# Patient Record
Sex: FEMALE | Race: WHITE | NOT HISPANIC OR LATINO | Employment: OTHER | URBAN - METROPOLITAN AREA
[De-identification: names, ages, dates, MRNs, and addresses within clinical notes are randomized per-mention and may not be internally consistent; named-entity substitution may affect disease eponyms.]

---

## 2023-02-15 PROBLEM — L98.9 SKIN LESION: Status: RESOLVED | Noted: 2023-02-15 | Resolved: 2023-02-15

## 2023-02-15 PROBLEM — I73.00 RAYNAUD'S PHENOMENON (SECONDARY): Status: ACTIVE | Noted: 2023-02-15

## 2023-02-15 PROBLEM — F32.A DEPRESSION: Status: ACTIVE | Noted: 2023-02-15

## 2023-02-15 PROBLEM — R79.89 LOW TSH LEVEL: Status: ACTIVE | Noted: 2023-02-15

## 2023-02-15 PROBLEM — H53.2 DOUBLE VISION: Status: ACTIVE | Noted: 2023-02-15

## 2023-02-15 PROBLEM — R92.8 ABNORMAL MAMMOGRAM: Status: RESOLVED | Noted: 2023-02-15 | Resolved: 2023-02-15

## 2023-02-15 PROBLEM — M62.838 MUSCLE SPASMS OF NECK: Status: RESOLVED | Noted: 2023-02-15 | Resolved: 2023-02-15

## 2023-02-15 PROBLEM — M94.0 ACUTE COSTOCHONDRITIS: Status: RESOLVED | Noted: 2023-02-15 | Resolved: 2023-02-15

## 2023-02-15 PROBLEM — B02.9 SHINGLES: Status: RESOLVED | Noted: 2023-02-15 | Resolved: 2023-02-15

## 2023-02-15 PROBLEM — E03.9 ADULT HYPOTHYROIDISM: Status: ACTIVE | Noted: 2023-02-15

## 2023-02-15 PROBLEM — I49.3 PVC (PREMATURE VENTRICULAR CONTRACTION): Status: ACTIVE | Noted: 2023-02-15

## 2023-02-15 PROBLEM — B02.29 POST HERPETIC NEURALGIA: Status: RESOLVED | Noted: 2023-02-15 | Resolved: 2023-02-15

## 2023-02-15 PROBLEM — M25.519 SHOULDER PAIN: Status: RESOLVED | Noted: 2023-02-15 | Resolved: 2023-02-15

## 2023-02-15 PROBLEM — M25.569 KNEE PAIN: Status: RESOLVED | Noted: 2023-02-15 | Resolved: 2023-02-15

## 2023-02-15 PROBLEM — S72.009A CLOSED FRACTURE OF PART OF NECK OF FEMUR (MULTI): Status: ACTIVE | Noted: 2023-02-15

## 2023-02-15 PROBLEM — K59.09 CONSTIPATION, CHRONIC: Status: ACTIVE | Noted: 2023-02-15

## 2023-02-15 PROBLEM — S16.1XXD NECK STRAIN, SUBSEQUENT ENCOUNTER: Status: RESOLVED | Noted: 2023-02-15 | Resolved: 2023-02-15

## 2023-02-15 PROBLEM — S82.891A CLOSED FRACTURE OF RIGHT ANKLE: Status: ACTIVE | Noted: 2023-02-15

## 2023-02-15 PROBLEM — G47.00 INSOMNIA: Status: ACTIVE | Noted: 2023-02-15

## 2023-02-15 PROBLEM — M81.0 OSTEOPOROSIS: Status: ACTIVE | Noted: 2023-02-15

## 2023-02-15 PROBLEM — R00.1 BRADYCARDIA: Status: ACTIVE | Noted: 2023-02-15

## 2023-02-15 PROBLEM — K63.5 COLON POLYP: Status: ACTIVE | Noted: 2023-02-15

## 2023-02-15 PROBLEM — I10 HYPERTENSION: Status: ACTIVE | Noted: 2023-02-15

## 2023-02-15 PROBLEM — S49.90XA SHOULDER INJURY: Status: RESOLVED | Noted: 2023-02-15 | Resolved: 2023-02-15

## 2023-02-15 PROBLEM — F41.9 ANXIETY DISORDER: Status: ACTIVE | Noted: 2023-02-15

## 2023-02-15 PROBLEM — S82.873D CLOSED DISPLACED PILON FRACTURE OF TIBIA WITH ROUTINE HEALING: Status: ACTIVE | Noted: 2023-02-15

## 2023-02-15 RX ORDER — RALOXIFENE HYDROCHLORIDE 60 MG/1
1 TABLET, FILM COATED ORAL DAILY
COMMUNITY
Start: 2020-03-04 | End: 2023-10-30 | Stop reason: SDUPTHER

## 2023-02-15 RX ORDER — ZOLPIDEM TARTRATE 10 MG/1
1 TABLET ORAL DAILY
COMMUNITY
Start: 2022-08-04 | End: 2023-06-05 | Stop reason: SDUPTHER

## 2023-02-15 RX ORDER — QUETIAPINE FUMARATE 100 MG/1
1 TABLET, FILM COATED ORAL NIGHTLY
COMMUNITY
Start: 2022-05-03 | End: 2023-08-09 | Stop reason: ALTCHOICE

## 2023-02-15 RX ORDER — GABAPENTIN 300 MG/1
1 CAPSULE ORAL 4 TIMES DAILY
COMMUNITY
Start: 2020-08-04 | End: 2023-06-08 | Stop reason: SDUPTHER

## 2023-02-15 RX ORDER — METOPROLOL SUCCINATE 25 MG/1
1 TABLET, EXTENDED RELEASE ORAL DAILY
COMMUNITY
Start: 2019-12-12 | End: 2023-09-12 | Stop reason: SDUPTHER

## 2023-02-15 RX ORDER — LIOTHYRONINE SODIUM 5 UG/1
1 TABLET ORAL 3 TIMES DAILY
COMMUNITY
Start: 2019-04-16 | End: 2023-11-14 | Stop reason: SDUPTHER

## 2023-02-15 RX ORDER — CITALOPRAM 20 MG/1
1 TABLET, FILM COATED ORAL DAILY
COMMUNITY
Start: 2020-02-12 | End: 2023-05-09 | Stop reason: SDUPTHER

## 2023-02-15 RX ORDER — LEVOTHYROXINE SODIUM 50 UG/1
1 TABLET ORAL
COMMUNITY
Start: 2019-09-10 | End: 2023-05-09 | Stop reason: SDUPTHER

## 2023-03-17 ENCOUNTER — TELEPHONE (OUTPATIENT)
Dept: PRIMARY CARE | Facility: CLINIC | Age: 83
End: 2023-03-17
Payer: MEDICARE

## 2023-03-17 DIAGNOSIS — N76.0 VAGINITIS AND VULVOVAGINITIS: Primary | ICD-10-CM

## 2023-03-17 RX ORDER — MIRTAZAPINE 7.5 MG/1
7.5 TABLET, FILM COATED ORAL NIGHTLY
COMMUNITY
Start: 2022-04-29 | End: 2023-08-09 | Stop reason: ALTCHOICE

## 2023-03-17 RX ORDER — TRIAMCINOLONE ACETONIDE 1 MG/G
OINTMENT TOPICAL
COMMUNITY
Start: 2022-04-13

## 2023-03-17 RX ORDER — LEVALBUTEROL TARTRATE 45 UG/1
AEROSOL, METERED ORAL
COMMUNITY
Start: 2023-02-13 | End: 2023-08-09 | Stop reason: ALTCHOICE

## 2023-03-17 RX ORDER — NYSTATIN AND TRIAMCINOLONE ACETONIDE 100000; 1 [USP'U]/G; MG/G
CREAM TOPICAL 2 TIMES DAILY
Qty: 30 G | Refills: 1 | Status: SHIPPED | OUTPATIENT
Start: 2023-03-17 | End: 2023-08-09 | Stop reason: ALTCHOICE

## 2023-03-17 RX ORDER — MELOXICAM 15 MG/1
1 TABLET ORAL DAILY
COMMUNITY
Start: 2022-03-26 | End: 2023-08-09 | Stop reason: ALTCHOICE

## 2023-03-17 RX ORDER — VERAPAMIL HYDROCHLORIDE 120 MG/1
120 CAPSULE, EXTENDED RELEASE ORAL DAILY
COMMUNITY
Start: 2023-01-19 | End: 2023-04-06 | Stop reason: ALTCHOICE

## 2023-03-17 RX ORDER — LIDOCAINE 560 MG/1
PATCH PERCUTANEOUS; TOPICAL; TRANSDERMAL 3 TIMES DAILY PRN
COMMUNITY
Start: 2022-10-15 | End: 2023-08-09 | Stop reason: ALTCHOICE

## 2023-04-06 ENCOUNTER — OFFICE VISIT (OUTPATIENT)
Dept: PRIMARY CARE | Facility: CLINIC | Age: 83
End: 2023-04-06
Payer: MEDICARE

## 2023-04-06 VITALS
SYSTOLIC BLOOD PRESSURE: 140 MMHG | HEIGHT: 59 IN | DIASTOLIC BLOOD PRESSURE: 86 MMHG | OXYGEN SATURATION: 97 % | HEART RATE: 71 BPM | WEIGHT: 107 LBS | BODY MASS INDEX: 21.57 KG/M2 | TEMPERATURE: 97.1 F

## 2023-04-06 DIAGNOSIS — B02.22 POST-HERPETIC TRIGEMINAL NEURALGIA: ICD-10-CM

## 2023-04-06 DIAGNOSIS — I10 PRIMARY HYPERTENSION: Primary | ICD-10-CM

## 2023-04-06 PROCEDURE — 3077F SYST BP >= 140 MM HG: CPT | Performed by: FAMILY MEDICINE

## 2023-04-06 PROCEDURE — 64600 INJECTION TREATMENT OF NERVE: CPT | Performed by: FAMILY MEDICINE

## 2023-04-06 PROCEDURE — 3079F DIAST BP 80-89 MM HG: CPT | Performed by: FAMILY MEDICINE

## 2023-04-06 PROCEDURE — 99213 OFFICE O/P EST LOW 20 MIN: CPT | Performed by: FAMILY MEDICINE

## 2023-04-06 RX ORDER — VERAPAMIL HYDROCHLORIDE 180 MG/1
180 TABLET, FILM COATED, EXTENDED RELEASE ORAL NIGHTLY
Qty: 90 TABLET | Refills: 3 | Status: SHIPPED | OUTPATIENT
Start: 2023-04-06 | End: 2023-08-09 | Stop reason: ALTCHOICE

## 2023-04-06 ASSESSMENT — PAIN SCALES - GENERAL: PAINLEVEL: 4

## 2023-04-19 PROBLEM — B02.22 POST-HERPETIC TRIGEMINAL NEURALGIA: Status: ACTIVE | Noted: 2023-02-15

## 2023-04-19 ASSESSMENT — ENCOUNTER SYMPTOMS
COUGH: 0
HEMATURIA: 0
EYE PAIN: 0
FEVER: 0
FREQUENCY: 0
WEAKNESS: 0
UNEXPECTED WEIGHT CHANGE: 0
PALPITATIONS: 0
CONFUSION: 0
NUMBNESS: 0
VOMITING: 0
HALLUCINATIONS: 0
SORE THROAT: 0
NAUSEA: 0
DECREASED CONCENTRATION: 0
DIARRHEA: 0
FATIGUE: 0
DIZZINESS: 0
ABDOMINAL PAIN: 0
JOINT SWELLING: 0
SHORTNESS OF BREATH: 0
BLOOD IN STOOL: 0
DYSURIA: 0
HEADACHES: 0
TROUBLE SWALLOWING: 0

## 2023-04-19 NOTE — PATIENT INSTRUCTIONS
It was nice to see you today!  Discussed current concerns and addressed, injections done with relief of pain  Reviewed recent labs and diagnostics  Reviewed medications list, RF verapamil  Continue to eat a healthy diet, exercise at least 3 times a week or more  Plan and follow up discussed

## 2023-04-19 NOTE — PROGRESS NOTES
Subjective   Patient ID: Selam Sanabria is a 83 y.o. female.    Patient here to have supraorbital and supratrochlear nerve block. 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 Oral and topical meds little help. Ha shad before. BP good on meds. Needs RF, no CP, no generalized HA. Doing well. Active. Diet good.        Review of Systems   Constitutional:  Negative for fatigue, fever and unexpected weight change.   HENT:  Negative for congestion, ear pain, hearing loss, sore throat and trouble swallowing.         +R brow and forehead pain, burning.   Eyes:  Negative for pain and visual disturbance.   Respiratory:  Negative for cough and shortness of breath.    Cardiovascular:  Negative for chest pain, palpitations and leg swelling.   Gastrointestinal:  Negative for abdominal pain, blood in stool, diarrhea, nausea and vomiting.   Genitourinary:  Negative for dysuria, frequency, hematuria and urgency.   Musculoskeletal:  Negative for joint swelling.   Skin:  Negative for pallor and rash.   Neurological:  Negative for dizziness, syncope, weakness, numbness and headaches.   Psychiatric/Behavioral:  Negative for confusion, decreased concentration, hallucinations and suicidal ideas.      Vitals:    04/06/23 1416   BP: 140/86   Pulse: 71   Temp: 36.2 °C (97.1 °F)   SpO2: 97%      Objective   Physical Exam  Constitutional:       Appearance: Normal appearance.   HENT:      Head: Normocephalic.        Comments: Red area injected .3cc each of soln of kenalog .5cc (20mg) and .5cc lidocaine with epi.   Blue area is where pain is.  Cardiovascular:      Rate and Rhythm: Normal rate and regular rhythm.      Heart sounds: Normal heart sounds.   Pulmonary:      Effort: Pulmonary effort is normal.      Breath sounds: Normal breath sounds.   Musculoskeletal:      Cervical back: Neck supple.   Skin:     General: Skin is warm and dry.   Neurological:      General: No focal deficit present.      Mental Status: She is alert.   Psychiatric:          Mood and Affect: Mood normal.         Speech: Speech normal.         Behavior: Behavior normal.         Cognition and Memory: Cognition normal.         Assessment/Plan   Diagnoses and all orders for this visit:  Primary hypertension  -     verapamil SR (Calan-SR) 180 mg ER tablet; Take 1 tablet (180 mg) by mouth once daily at bedtime. Do not crush or chew.

## 2023-04-24 ENCOUNTER — OFFICE VISIT (OUTPATIENT)
Dept: PRIMARY CARE | Facility: CLINIC | Age: 83
End: 2023-04-24
Payer: MEDICARE

## 2023-04-24 VITALS
BODY MASS INDEX: 21.2 KG/M2 | OXYGEN SATURATION: 98 % | SYSTOLIC BLOOD PRESSURE: 100 MMHG | TEMPERATURE: 97.2 F | HEART RATE: 69 BPM | DIASTOLIC BLOOD PRESSURE: 60 MMHG | HEIGHT: 60 IN | WEIGHT: 108 LBS

## 2023-04-24 DIAGNOSIS — L23.9 ALLERGIC DERMATITIS: Primary | ICD-10-CM

## 2023-04-24 PROBLEM — Z86.19 PERSONAL HISTORY OF OTHER INFECTIOUS AND PARASITIC DISEASES: Status: ACTIVE | Noted: 2019-12-20

## 2023-04-24 PROBLEM — M62.81 MUSCLE WEAKNESS (GENERALIZED): Status: ACTIVE | Noted: 2019-12-20

## 2023-04-24 PROBLEM — B02.23 POSTHERPETIC POLYNEUROPATHY: Status: ACTIVE | Noted: 2019-12-20

## 2023-04-24 PROBLEM — S82.831D OTHER FRACTURE OF UPPER AND LOWER END OF RIGHT FIBULA, SUBSEQUENT ENCOUNTER FOR CLOSED FRACTURE WITH ROUTINE HEALING: Status: ACTIVE | Noted: 2019-12-20

## 2023-04-24 PROBLEM — S82.251D: Status: ACTIVE | Noted: 2019-12-20

## 2023-04-24 PROBLEM — S92.314D: Status: ACTIVE | Noted: 2019-12-20

## 2023-04-24 PROBLEM — G14 POSTPOLIO SYNDROME (CMS-HCC): Status: ACTIVE | Noted: 2019-12-20

## 2023-04-24 PROBLEM — M81.0 AGE-RELATED OSTEOPOROSIS WITHOUT CURRENT PATHOLOGICAL FRACTURE: Status: ACTIVE | Noted: 2019-12-20

## 2023-04-24 PROCEDURE — 3078F DIAST BP <80 MM HG: CPT | Performed by: INTERNAL MEDICINE

## 2023-04-24 PROCEDURE — 99212 OFFICE O/P EST SF 10 MIN: CPT | Performed by: INTERNAL MEDICINE

## 2023-04-24 PROCEDURE — 3074F SYST BP LT 130 MM HG: CPT | Performed by: INTERNAL MEDICINE

## 2023-04-24 RX ORDER — METHOCARBAMOL 500 MG/1
500 TABLET, FILM COATED ORAL EVERY 8 HOURS PRN
COMMUNITY
Start: 2021-04-14 | End: 2023-08-09 | Stop reason: ALTCHOICE

## 2023-04-24 RX ORDER — DIAPER,BRIEF,INFANT-TODD,DISP
EACH MISCELLANEOUS 2 TIMES DAILY
Qty: 30 G | Refills: 1 | Status: SHIPPED | OUTPATIENT
Start: 2023-04-24 | End: 2023-08-09 | Stop reason: ALTCHOICE

## 2023-04-24 RX ORDER — AMLODIPINE BESYLATE 5 MG/1
5 TABLET ORAL
COMMUNITY
Start: 2021-07-30 | End: 2023-09-06 | Stop reason: SDUPTHER

## 2023-04-24 RX ORDER — AZELASTINE HYDROCHLORIDE 0.5 MG/ML
1 SOLUTION/ DROPS OPHTHALMIC
COMMUNITY
Start: 2021-02-23 | End: 2023-08-09 | Stop reason: ALTCHOICE

## 2023-04-24 RX ORDER — ARTIFICIAL TEARS 1; 2; 3 MG/ML; MG/ML; MG/ML
SOLUTION/ DROPS OPHTHALMIC
COMMUNITY

## 2023-04-24 ASSESSMENT — ENCOUNTER SYMPTOMS
DIZZINESS: 0
NAUSEA: 0
DIARRHEA: 0
SINUS PAIN: 0
ACTIVITY CHANGE: 0
NECK PAIN: 0
CONSTIPATION: 0
COLOR CHANGE: 1
VOICE CHANGE: 0
ABDOMINAL PAIN: 0
FATIGUE: 0
WHEEZING: 0
MYALGIAS: 0
WEAKNESS: 0
UNEXPECTED WEIGHT CHANGE: 0
TROUBLE SWALLOWING: 0
SHORTNESS OF BREATH: 0
PALPITATIONS: 0
FACIAL ASYMMETRY: 0
HALLUCINATIONS: 0
SPEECH DIFFICULTY: 0
NERVOUS/ANXIOUS: 0
NUMBNESS: 0
SEIZURES: 0
STRIDOR: 0
POLYDIPSIA: 0
CHEST TIGHTNESS: 0
SLEEP DISTURBANCE: 0
SORE THROAT: 0
FLANK PAIN: 0
BACK PAIN: 0
POLYPHAGIA: 0
FEVER: 0
WOUND: 0
ADENOPATHY: 0
VOMITING: 0
BLOOD IN STOOL: 0
EYE PAIN: 0
PHOTOPHOBIA: 0
APPETITE CHANGE: 0
CONFUSION: 0
DYSURIA: 0
HEADACHES: 0

## 2023-04-24 ASSESSMENT — PATIENT HEALTH QUESTIONNAIRE - PHQ9
SUM OF ALL RESPONSES TO PHQ9 QUESTIONS 1 AND 2: 0
2. FEELING DOWN, DEPRESSED OR HOPELESS: NOT AT ALL
1. LITTLE INTEREST OR PLEASURE IN DOING THINGS: NOT AT ALL

## 2023-04-24 ASSESSMENT — PAIN SCALES - GENERAL: PAINLEVEL: 0-NO PAIN

## 2023-04-24 NOTE — PROGRESS NOTES
"Subjective   Patient ID: Selam Sanabria is a 83 y.o. female who presents for Rash (Left lower leg).    HPI   She started erythematous rash in left lower leg which is not painful and localized in an area. Symptoms started 2 days ago and she was worried about Shingles and start taking Acyclovir. Patient had Shingrix vaccination in the past.    Review of Systems   Constitutional:  Negative for activity change, appetite change, fatigue, fever and unexpected weight change.   HENT:  Negative for dental problem, ear discharge, hearing loss, nosebleeds, postnasal drip, sinus pain, sore throat, trouble swallowing and voice change.    Eyes:  Negative for photophobia, pain and visual disturbance.   Respiratory:  Negative for chest tightness, shortness of breath, wheezing and stridor.    Cardiovascular:  Negative for chest pain, palpitations and leg swelling.   Gastrointestinal:  Negative for abdominal pain, blood in stool, constipation, diarrhea, nausea and vomiting.   Endocrine: Negative for polydipsia, polyphagia and polyuria.   Genitourinary:  Negative for decreased urine volume, dyspareunia, dysuria, flank pain and urgency.   Musculoskeletal:  Negative for back pain, gait problem, myalgias and neck pain.   Skin:  Positive for color change and rash. Negative for wound.   Allergic/Immunologic: Negative for environmental allergies and food allergies.   Neurological:  Negative for dizziness, seizures, syncope, facial asymmetry, speech difficulty, weakness, numbness and headaches.   Hematological:  Negative for adenopathy.   Psychiatric/Behavioral:  Negative for behavioral problems, confusion, hallucinations, sleep disturbance and suicidal ideas. The patient is not nervous/anxious.      Objective   /60   Pulse 69   Temp 36.2 °C (97.2 °F)   Ht 1.511 m (4' 11.5\")   Wt 49 kg (108 lb)   SpO2 98%   BMI 21.45 kg/m²     Physical Exam  Constitutional:       General: She is not in acute distress.     Appearance: Normal " appearance. She is not ill-appearing or toxic-appearing.   Eyes:      General:         Right eye: No discharge.         Left eye: No discharge.      Conjunctiva/sclera: Conjunctivae normal.   Cardiovascular:      Rate and Rhythm: Normal rate and regular rhythm.   Pulmonary:      Effort: Pulmonary effort is normal. No respiratory distress.      Breath sounds: Normal breath sounds.   Musculoskeletal:         General: No tenderness, deformity or signs of injury. Normal range of motion.   Skin:     General: Skin is warm.      Coloration: Skin is not jaundiced or pale.      Findings: Erythema, lesion and rash (Erythematous rash is circular in appearance with no oozing and no fluid filled vesicle. Size is less than a quarter. Non tender and no surrounding skin findgings.) present. No bruising.   Neurological:      Mental Status: She is alert.       Assessment/Plan   Problem List Items Addressed This Visit    None  Visit Diagnoses       Allergic dermatitis    -  Primary    Relevant Medications    hydrocortisone 0.5 % cream     Reassurance given  at this does not appears to be shingles related rash.

## 2023-05-02 ENCOUNTER — LAB (OUTPATIENT)
Dept: LAB | Facility: LAB | Age: 83
End: 2023-05-02
Payer: MEDICARE

## 2023-05-02 ENCOUNTER — OFFICE VISIT (OUTPATIENT)
Dept: PRIMARY CARE | Facility: CLINIC | Age: 83
End: 2023-05-02
Payer: MEDICARE

## 2023-05-02 VITALS
DIASTOLIC BLOOD PRESSURE: 76 MMHG | BODY MASS INDEX: 21.77 KG/M2 | WEIGHT: 108 LBS | TEMPERATURE: 96.8 F | HEART RATE: 63 BPM | OXYGEN SATURATION: 93 % | HEIGHT: 59 IN | SYSTOLIC BLOOD PRESSURE: 128 MMHG

## 2023-05-02 DIAGNOSIS — I10 PRIMARY HYPERTENSION: ICD-10-CM

## 2023-05-02 DIAGNOSIS — G14 POSTPOLIO SYNDROME (CMS-HCC): ICD-10-CM

## 2023-05-02 DIAGNOSIS — I10 PRIMARY HYPERTENSION: Primary | ICD-10-CM

## 2023-05-02 DIAGNOSIS — E03.9 ADULT HYPOTHYROIDISM: ICD-10-CM

## 2023-05-02 PROBLEM — R58 ECCHYMOSIS: Status: ACTIVE | Noted: 2023-05-02

## 2023-05-02 LAB
ALANINE AMINOTRANSFERASE (SGPT) (U/L) IN SER/PLAS: 17 U/L (ref 7–45)
ALBUMIN (G/DL) IN SER/PLAS: 4.6 G/DL (ref 3.4–5)
ALKALINE PHOSPHATASE (U/L) IN SER/PLAS: 47 U/L (ref 33–136)
ANION GAP IN SER/PLAS: 10 MMOL/L (ref 10–20)
ASPARTATE AMINOTRANSFERASE (SGOT) (U/L) IN SER/PLAS: 26 U/L (ref 9–39)
BASOPHILS (10*3/UL) IN BLOOD BY AUTOMATED COUNT: 0.05 X10E9/L (ref 0–0.1)
BASOPHILS/100 LEUKOCYTES IN BLOOD BY AUTOMATED COUNT: 0.9 % (ref 0–2)
BILIRUBIN TOTAL (MG/DL) IN SER/PLAS: 0.8 MG/DL (ref 0–1.2)
CALCIUM (MG/DL) IN SER/PLAS: 9.7 MG/DL (ref 8.6–10.3)
CARBON DIOXIDE, TOTAL (MMOL/L) IN SER/PLAS: 31 MMOL/L (ref 21–32)
CHLORIDE (MMOL/L) IN SER/PLAS: 97 MMOL/L (ref 98–107)
CHOLESTEROL (MG/DL) IN SER/PLAS: 207 MG/DL (ref 0–199)
CHOLESTEROL IN HDL (MG/DL) IN SER/PLAS: 101.5 MG/DL
CHOLESTEROL/HDL RATIO: 2
CREATININE (MG/DL) IN SER/PLAS: 0.77 MG/DL (ref 0.5–1.05)
EOSINOPHILS (10*3/UL) IN BLOOD BY AUTOMATED COUNT: 0.15 X10E9/L (ref 0–0.4)
EOSINOPHILS/100 LEUKOCYTES IN BLOOD BY AUTOMATED COUNT: 2.7 % (ref 0–6)
ERYTHROCYTE DISTRIBUTION WIDTH (RATIO) BY AUTOMATED COUNT: 13.9 % (ref 11.5–14.5)
ERYTHROCYTE MEAN CORPUSCULAR HEMOGLOBIN CONCENTRATION (G/DL) BY AUTOMATED: 32.1 G/DL (ref 32–36)
ERYTHROCYTE MEAN CORPUSCULAR VOLUME (FL) BY AUTOMATED COUNT: 94 FL (ref 80–100)
ERYTHROCYTES (10*6/UL) IN BLOOD BY AUTOMATED COUNT: 4.42 X10E12/L (ref 4–5.2)
GFR FEMALE: 76 ML/MIN/1.73M2
GLUCOSE (MG/DL) IN SER/PLAS: 87 MG/DL (ref 74–99)
HEMATOCRIT (%) IN BLOOD BY AUTOMATED COUNT: 41.4 % (ref 36–46)
HEMOGLOBIN (G/DL) IN BLOOD: 13.3 G/DL (ref 12–16)
IMMATURE GRANULOCYTES/100 LEUKOCYTES IN BLOOD BY AUTOMATED COUNT: 0.2 % (ref 0–0.9)
LDL: 97 MG/DL (ref 0–99)
LEUKOCYTES (10*3/UL) IN BLOOD BY AUTOMATED COUNT: 5.5 X10E9/L (ref 4.4–11.3)
LYMPHOCYTES (10*3/UL) IN BLOOD BY AUTOMATED COUNT: 1.1 X10E9/L (ref 0.8–3)
LYMPHOCYTES/100 LEUKOCYTES IN BLOOD BY AUTOMATED COUNT: 19.9 % (ref 13–44)
MONOCYTES (10*3/UL) IN BLOOD BY AUTOMATED COUNT: 0.53 X10E9/L (ref 0.05–0.8)
MONOCYTES/100 LEUKOCYTES IN BLOOD BY AUTOMATED COUNT: 9.6 % (ref 2–10)
NEUTROPHILS (10*3/UL) IN BLOOD BY AUTOMATED COUNT: 3.7 X10E9/L (ref 1.6–5.5)
NEUTROPHILS/100 LEUKOCYTES IN BLOOD BY AUTOMATED COUNT: 66.7 % (ref 40–80)
PLATELETS (10*3/UL) IN BLOOD AUTOMATED COUNT: 250 X10E9/L (ref 150–450)
POTASSIUM (MMOL/L) IN SER/PLAS: 4.1 MMOL/L (ref 3.5–5.3)
PROTEIN TOTAL: 6.7 G/DL (ref 6.4–8.2)
SODIUM (MMOL/L) IN SER/PLAS: 134 MMOL/L (ref 136–145)
THYROTROPIN (MIU/L) IN SER/PLAS BY DETECTION LIMIT <= 0.05 MIU/L: 1.31 MIU/L (ref 0.44–3.98)
TRIGLYCERIDE (MG/DL) IN SER/PLAS: 45 MG/DL (ref 0–149)
UREA NITROGEN (MG/DL) IN SER/PLAS: 16 MG/DL (ref 6–23)
VLDL: 9 MG/DL (ref 0–40)

## 2023-05-02 PROCEDURE — 3078F DIAST BP <80 MM HG: CPT | Performed by: FAMILY MEDICINE

## 2023-05-02 PROCEDURE — 80061 LIPID PANEL: CPT

## 2023-05-02 PROCEDURE — 85025 COMPLETE CBC W/AUTO DIFF WBC: CPT

## 2023-05-02 PROCEDURE — 3074F SYST BP LT 130 MM HG: CPT | Performed by: FAMILY MEDICINE

## 2023-05-02 PROCEDURE — 36415 COLL VENOUS BLD VENIPUNCTURE: CPT

## 2023-05-02 PROCEDURE — 80053 COMPREHEN METABOLIC PANEL: CPT

## 2023-05-02 PROCEDURE — 99213 OFFICE O/P EST LOW 20 MIN: CPT | Performed by: FAMILY MEDICINE

## 2023-05-02 PROCEDURE — 84443 ASSAY THYROID STIM HORMONE: CPT

## 2023-05-02 ASSESSMENT — ENCOUNTER SYMPTOMS
WEAKNESS: 1
FATIGUE: 0
SHORTNESS OF BREATH: 0
FEVER: 0
DIZZINESS: 0
VOMITING: 0
PALPITATIONS: 0
HEMATURIA: 0
NAUSEA: 0
CONFUSION: 0
HALLUCINATIONS: 0
JOINT SWELLING: 0
DYSURIA: 0
TROUBLE SWALLOWING: 0
EYE PAIN: 1
COUGH: 0
ABDOMINAL PAIN: 0
DIARRHEA: 0
HEADACHES: 1
BLOOD IN STOOL: 0
FREQUENCY: 0
NUMBNESS: 1
DECREASED CONCENTRATION: 0
SORE THROAT: 0
UNEXPECTED WEIGHT CHANGE: 0

## 2023-05-02 ASSESSMENT — PAIN SCALES - GENERAL: PAINLEVEL: 0-NO PAIN

## 2023-05-02 NOTE — RESULT ENCOUNTER NOTE
Please notify patient result is normal. Please let her know labs are great. No reason for her bruising/broken vessels in legs. Try not to bang them.

## 2023-05-02 NOTE — PROGRESS NOTES
Subjective   Patient ID: Selam Sanabria is a 83 y.o. female.    Focal bruising L anterior shin. No blood thinners, no ASA, no trauma. No nosebleeds, rectal bleeding. No new meds or supplements.        Review of Systems   Constitutional:  Negative for fatigue, fever and unexpected weight change.   HENT:  Negative for congestion, ear pain, hearing loss, sore throat and trouble swallowing.    Eyes:  Positive for pain. Negative for visual disturbance.   Respiratory:  Negative for cough and shortness of breath.    Cardiovascular:  Negative for chest pain, palpitations and leg swelling.   Gastrointestinal:  Negative for abdominal pain, blood in stool, diarrhea, nausea and vomiting.   Genitourinary:  Negative for dysuria, frequency, hematuria and urgency.   Musculoskeletal:  Negative for joint swelling.   Skin:  Negative for pallor and rash.   Neurological:  Positive for weakness, numbness and headaches. Negative for dizziness and syncope.   Psychiatric/Behavioral:  Negative for confusion, decreased concentration, hallucinations and suicidal ideas.      Vitals:    05/02/23 1054   BP: 128/76   Pulse: 63   Temp: 36 °C (96.8 °F)   SpO2: 93%      Objective   Physical Exam    Assessment/Plan   Diagnoses and all orders for this visit:  Primary hypertension  -     Thyroid Stimulating Hormone; Future  Adult hypothyroidism  -     CBC and Auto Differential; Future  -     Comprehensive Metabolic Panel; Future  -     Lipid Panel; Future  Postpolio syndrome  -     CBC and Auto Differential; Future  -     Comprehensive Metabolic Panel; Future

## 2023-05-02 NOTE — PATIENT INSTRUCTIONS
It was nice to see you today!  Discussed current concerns and addressed   Reviewed recent labs and diagnostics  Reviewed medications list  Continue to eat a healthy diet, exercise at least 3 times a week or more  Plan and follow up discussed  Check labs, CBC and platelets in particular  Will notify of results

## 2023-05-08 PROBLEM — R19.5 CHANGE IN STOOL CALIBER: Status: ACTIVE | Noted: 2023-05-08

## 2023-05-08 PROBLEM — S72.001A HIP FRACTURE, RIGHT (MULTI): Status: ACTIVE | Noted: 2023-05-08

## 2023-05-08 PROBLEM — U07.1 COVID-19 VIRUS INFECTION: Status: RESOLVED | Noted: 2023-05-08 | Resolved: 2023-05-08

## 2023-05-08 PROBLEM — I83.90 SUPERFICIAL VARICOSITIES: Status: ACTIVE | Noted: 2023-05-08

## 2023-05-08 PROBLEM — J06.0 SORE THROAT AND LARYNGITIS: Status: ACTIVE | Noted: 2023-05-08

## 2023-05-08 RX ORDER — SENNOSIDES 8.6 MG/1
TABLET ORAL
COMMUNITY
Start: 2020-02-12

## 2023-05-09 DIAGNOSIS — F41.1 GENERALIZED ANXIETY DISORDER: ICD-10-CM

## 2023-05-09 DIAGNOSIS — E03.9 ADULT HYPOTHYROIDISM: Primary | ICD-10-CM

## 2023-05-09 RX ORDER — CITALOPRAM 20 MG/1
20 TABLET, FILM COATED ORAL DAILY
Qty: 90 TABLET | Refills: 3 | Status: SHIPPED | OUTPATIENT
Start: 2023-05-09 | End: 2023-09-12 | Stop reason: SDUPTHER

## 2023-05-09 RX ORDER — LEVOTHYROXINE SODIUM 50 UG/1
50 TABLET ORAL
Qty: 90 TABLET | Refills: 3 | Status: SHIPPED | OUTPATIENT
Start: 2023-05-09 | End: 2024-04-29

## 2023-06-01 ENCOUNTER — OFFICE VISIT (OUTPATIENT)
Dept: PRIMARY CARE | Facility: CLINIC | Age: 83
End: 2023-06-01
Payer: MEDICARE

## 2023-06-01 VITALS
SYSTOLIC BLOOD PRESSURE: 144 MMHG | OXYGEN SATURATION: 97 % | DIASTOLIC BLOOD PRESSURE: 84 MMHG | HEART RATE: 58 BPM | WEIGHT: 106 LBS | HEIGHT: 59 IN | BODY MASS INDEX: 21.37 KG/M2

## 2023-06-01 DIAGNOSIS — G14 POSTPOLIO SYNDROME (CMS-HCC): ICD-10-CM

## 2023-06-01 DIAGNOSIS — F51.01 PRIMARY INSOMNIA: ICD-10-CM

## 2023-06-01 DIAGNOSIS — B02.22 POST-HERPETIC TRIGEMINAL NEURALGIA: Primary | ICD-10-CM

## 2023-06-01 PROCEDURE — 99213 OFFICE O/P EST LOW 20 MIN: CPT | Performed by: FAMILY MEDICINE

## 2023-06-01 PROCEDURE — 3077F SYST BP >= 140 MM HG: CPT | Performed by: FAMILY MEDICINE

## 2023-06-01 PROCEDURE — 3079F DIAST BP 80-89 MM HG: CPT | Performed by: FAMILY MEDICINE

## 2023-06-01 PROCEDURE — 1159F MED LIST DOCD IN RCRD: CPT | Performed by: FAMILY MEDICINE

## 2023-06-01 PROCEDURE — 64400 NJX AA&/STRD TRIGEMINAL NRV: CPT | Performed by: FAMILY MEDICINE

## 2023-06-01 ASSESSMENT — PAIN SCALES - GENERAL: PAINLEVEL: 0-NO PAIN

## 2023-06-05 DIAGNOSIS — F51.01 PRIMARY INSOMNIA: Primary | ICD-10-CM

## 2023-06-06 RX ORDER — ZOLPIDEM TARTRATE 10 MG/1
10 TABLET ORAL DAILY
Qty: 90 TABLET | Refills: 1 | Status: SHIPPED | OUTPATIENT
Start: 2023-06-06 | End: 2023-08-09 | Stop reason: ALTCHOICE

## 2023-06-07 ASSESSMENT — ENCOUNTER SYMPTOMS
PALPITATIONS: 0
DECREASED CONCENTRATION: 0
HEADACHES: 0
DIARRHEA: 0
SHORTNESS OF BREATH: 0
CONFUSION: 0
VOMITING: 0
NUMBNESS: 0
WEAKNESS: 1
ABDOMINAL PAIN: 0
HEMATURIA: 0
ARTHRALGIAS: 1
HALLUCINATIONS: 0
JOINT SWELLING: 0
FEVER: 0
DYSURIA: 0
MYALGIAS: 1
SORE THROAT: 0
UNEXPECTED WEIGHT CHANGE: 0
TROUBLE SWALLOWING: 0
EYE PAIN: 0
BLOOD IN STOOL: 0
NAUSEA: 0
FATIGUE: 1
COUGH: 0
FREQUENCY: 0
DIZZINESS: 0

## 2023-06-07 NOTE — PATIENT INSTRUCTIONS
It was nice to see you today!  Discussed current concerns and addressed   Reviewed recent labs and diagnostics  Reviewed medications list  Continue to eat a healthy diet, exercise at least 3 times a week or more  Plan and follow up discussed  For any further information related to your condition, copy and paste or go to [email or web address]  Patient is doing well on Ambien, refill when needed.

## 2023-06-07 NOTE — PROGRESS NOTES
Subjective   Patient ID: Selam Sanabria is a 83 y.o. female.    Patient with history of trigeminal neuralgia and did very well with a supraorbital and supratrochlear nerve block.  Would like to do today.  She has suffered from trigeminal neuralgia for years.  Oral medications have not been effective.She also has multiple other medical issues which were briefly discussed.  She is feeling well.  She is eating well.  She is staying physically active.        Review of Systems   Constitutional:  Positive for fatigue. Negative for fever and unexpected weight change.   HENT:  Negative for congestion, ear pain, hearing loss, sore throat and trouble swallowing.    Eyes:  Negative for pain and visual disturbance.   Respiratory:  Negative for cough and shortness of breath.    Cardiovascular:  Negative for chest pain, palpitations and leg swelling.   Gastrointestinal:  Negative for abdominal pain, blood in stool, diarrhea, nausea and vomiting.   Genitourinary:  Negative for dysuria, frequency, hematuria and urgency.   Musculoskeletal:  Positive for arthralgias and myalgias. Negative for joint swelling.   Skin:  Negative for pallor and rash.   Neurological:  Positive for weakness. Negative for dizziness, syncope, numbness and headaches.   Psychiatric/Behavioral:  Negative for confusion, decreased concentration, hallucinations and suicidal ideas.      Vitals:    06/01/23 1321   BP: 144/84   Pulse: 58   SpO2: 97%      Objective   Physical Exam  Constitutional:       Appearance: Normal appearance.   HENT:      Head:        Comments: Medial upper right brow was injected with a solution of 1/8 cc of Kenalog with another eighth of a cc of lidocaine 1% with epinephrine.  Another injection was done approximately 5 mm lateral to that  Cardiovascular:      Rate and Rhythm: Normal rate and regular rhythm.      Heart sounds: Normal heart sounds.   Pulmonary:      Effort: Pulmonary effort is normal.      Breath sounds: Normal breath sounds.    Musculoskeletal:      Cervical back: Neck supple.   Skin:     General: Skin is warm and dry.   Neurological:      General: No focal deficit present.      Mental Status: She is alert.   Psychiatric:         Mood and Affect: Mood normal.         Speech: Speech normal.         Behavior: Behavior normal.         Cognition and Memory: Cognition normal.         Assessment/Plan   Diagnoses and all orders for this visit:  Post-herpetic trigeminal neuralgia

## 2023-06-08 DIAGNOSIS — S92.314D NONDISPLACED FRACTURE OF FIRST METATARSAL BONE, RIGHT FOOT, SUBSEQUENT ENCOUNTER FOR FRACTURE WITH ROUTINE HEALING: ICD-10-CM

## 2023-06-08 DIAGNOSIS — M81.0 AGE-RELATED OSTEOPOROSIS WITHOUT CURRENT PATHOLOGICAL FRACTURE: ICD-10-CM

## 2023-06-08 DIAGNOSIS — M62.81 MUSCLE WEAKNESS (GENERALIZED): ICD-10-CM

## 2023-06-08 RX ORDER — GABAPENTIN 300 MG/1
300 CAPSULE ORAL 4 TIMES DAILY
Qty: 360 CAPSULE | Refills: 1 | Status: SHIPPED | OUTPATIENT
Start: 2023-06-08 | End: 2024-04-30 | Stop reason: SDUPTHER

## 2023-08-09 ENCOUNTER — OFFICE VISIT (OUTPATIENT)
Dept: PRIMARY CARE | Facility: CLINIC | Age: 83
End: 2023-08-09
Payer: MEDICARE

## 2023-08-09 VITALS
WEIGHT: 106 LBS | HEART RATE: 59 BPM | SYSTOLIC BLOOD PRESSURE: 128 MMHG | BODY MASS INDEX: 21.37 KG/M2 | OXYGEN SATURATION: 96 % | TEMPERATURE: 96.8 F | HEIGHT: 59 IN | DIASTOLIC BLOOD PRESSURE: 82 MMHG

## 2023-08-09 DIAGNOSIS — K59.09 CONSTIPATION, CHRONIC: Primary | ICD-10-CM

## 2023-08-09 DIAGNOSIS — G14 POSTPOLIO SYNDROME (CMS-HCC): ICD-10-CM

## 2023-08-09 DIAGNOSIS — M62.81 MUSCLE WEAKNESS (GENERALIZED): ICD-10-CM

## 2023-08-09 PROBLEM — R58 ECCHYMOSIS: Status: RESOLVED | Noted: 2023-05-02 | Resolved: 2023-08-09

## 2023-08-09 PROBLEM — Z86.19 PERSONAL HISTORY OF OTHER INFECTIOUS AND PARASITIC DISEASES: Status: RESOLVED | Noted: 2019-12-20 | Resolved: 2023-08-09

## 2023-08-09 PROBLEM — R19.5 CHANGE IN STOOL CALIBER: Status: RESOLVED | Noted: 2023-05-08 | Resolved: 2023-08-09

## 2023-08-09 PROBLEM — J06.0 SORE THROAT AND LARYNGITIS: Status: RESOLVED | Noted: 2023-05-08 | Resolved: 2023-08-09

## 2023-08-09 PROBLEM — B02.23 POSTHERPETIC POLYNEUROPATHY: Status: RESOLVED | Noted: 2019-12-20 | Resolved: 2023-08-09

## 2023-08-09 PROCEDURE — 1126F AMNT PAIN NOTED NONE PRSNT: CPT | Performed by: FAMILY MEDICINE

## 2023-08-09 PROCEDURE — 3074F SYST BP LT 130 MM HG: CPT | Performed by: FAMILY MEDICINE

## 2023-08-09 PROCEDURE — 99213 OFFICE O/P EST LOW 20 MIN: CPT | Performed by: FAMILY MEDICINE

## 2023-08-09 PROCEDURE — 1159F MED LIST DOCD IN RCRD: CPT | Performed by: FAMILY MEDICINE

## 2023-08-09 PROCEDURE — 3079F DIAST BP 80-89 MM HG: CPT | Performed by: FAMILY MEDICINE

## 2023-08-09 ASSESSMENT — ENCOUNTER SYMPTOMS
HEMATURIA: 0
FEVER: 0
ABDOMINAL PAIN: 0
NERVOUS/ANXIOUS: 1
UNEXPECTED WEIGHT CHANGE: 0
HALLUCINATIONS: 0
WEAKNESS: 1
FATIGUE: 0
DIZZINESS: 0
ARTHRALGIAS: 1
NAUSEA: 0
DIARRHEA: 0
CONFUSION: 0
EYE PAIN: 0
BLOOD IN STOOL: 0
VOMITING: 0
COUGH: 0
HEADACHES: 1
PALPITATIONS: 0
FREQUENCY: 0
TROUBLE SWALLOWING: 0
JOINT SWELLING: 0
DYSURIA: 0
NUMBNESS: 0
MYALGIAS: 1
SHORTNESS OF BREATH: 0
DECREASED CONCENTRATION: 0
BACK PAIN: 1
SORE THROAT: 0

## 2023-08-09 NOTE — PROGRESS NOTES
Subjective   Patient ID: Selam Sanabria is a 83 y.o. female.    Patient has medical issues but she has been constipated and nothing over-the-counter has helped until she started Dulcolax 5 days ago.  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  She has weakening of the lower extremities but mostly on the right.  She says her left knee bothers her but she denies pain.  She has done therapy in the past for fractures and other orthopedic issues.        Review of Systems   Constitutional:  Negative for fatigue, fever and unexpected weight change.   HENT:  Negative for congestion, ear pain, hearing loss, sore throat and trouble swallowing.    Eyes:  Negative for pain and visual disturbance.   Respiratory:  Negative for cough and shortness of breath.    Cardiovascular:  Negative for chest pain, palpitations and leg swelling.   Gastrointestinal:  Negative for abdominal pain, blood in stool, diarrhea, nausea and vomiting.   Genitourinary:  Negative for dysuria, frequency, hematuria and urgency.   Musculoskeletal:  Positive for arthralgias, back pain, gait problem and myalgias. Negative for joint swelling.   Skin:  Negative for pallor and rash.   Neurological:  Positive for weakness and headaches. Negative for dizziness, syncope and numbness.   Psychiatric/Behavioral:  Negative for confusion, decreased concentration, hallucinations and suicidal ideas. The patient is nervous/anxious.      Vitals:    08/09/23 1048   BP: 128/82   Pulse: 59   Temp: 36 °C (96.8 °F)   SpO2: 96%      Objective   Physical Exam  Constitutional:       Appearance: Normal appearance.   Cardiovascular:      Rate and Rhythm: Normal rate and regular rhythm.      Heart sounds: Normal heart sounds.   Pulmonary:      Effort: Pulmonary effort is normal.      Breath sounds: Normal breath sounds.   Musculoskeletal:      Cervical back: Neck supple.   Skin:     General: Skin is warm and dry.   Neurological:      General: No focal deficit present.      Mental Status: She  is alert.      Motor: Weakness present.      Gait: Gait abnormal.   Psychiatric:         Mood and Affect: Mood normal.         Speech: Speech normal.         Behavior: Behavior normal.         Cognition and Memory: Cognition normal.         Assessment/Plan   There are no diagnoses linked to this encounter.

## 2023-08-09 NOTE — PATIENT INSTRUCTIONS
It was nice to see you today!  Discussed current concerns and addressed   Reviewed recent labs and diagnostics  Reviewed medications list  Continue to eat a healthy diet, exercise at least 3 times a week or more  Plan and follow up discussed  For any further information related to your condition, copy and paste or go to [email or web address]  Patient will continue Dulcolax.  We can move onto one of the prescription medications if needed.  Discussed exercises and demonstrated which she could do to keep her leg strong.  Continue to use the walker.  She is going to stop the Ambien because she had a fall the other night and did not remember it.  She is a little sore on her right buttock but otherwise she feels fine.

## 2023-08-30 PROBLEM — L90.5 SCAR CONDITION AND FIBROSIS OF SKIN: Status: ACTIVE | Noted: 2023-07-07

## 2023-08-30 PROBLEM — Z85.828 PERSONAL HISTORY OF OTHER MALIGNANT NEOPLASM OF SKIN: Status: ACTIVE | Noted: 2023-07-07

## 2023-08-30 PROBLEM — C44.329 SQUAMOUS CELL CARCINOMA OF SKIN OF OTHER PARTS OF FACE: Status: ACTIVE | Noted: 2023-07-07

## 2023-08-30 PROBLEM — D22.5 MELANOCYTIC NEVI OF TRUNK: Status: ACTIVE | Noted: 2023-07-07

## 2023-08-30 PROBLEM — L57.0 ACTINIC KERATOSIS: Status: ACTIVE | Noted: 2023-07-07

## 2023-08-30 PROBLEM — D48.5 NEOPLASM OF UNCERTAIN BEHAVIOR OF SKIN: Status: ACTIVE | Noted: 2023-07-07

## 2023-08-30 PROBLEM — L82.0 INFLAMED SEBORRHEIC KERATOSIS: Status: ACTIVE | Noted: 2023-07-07

## 2023-08-30 PROBLEM — L81.4 OTHER MELANIN HYPERPIGMENTATION: Status: ACTIVE | Noted: 2023-07-07

## 2023-08-30 PROBLEM — B02.29 OTHER POSTHERPETIC NERVOUS SYSTEM INVOLVEMENT: Status: ACTIVE | Noted: 2023-07-07

## 2023-08-30 PROBLEM — L82.1 OTHER SEBORRHEIC KERATOSIS: Status: ACTIVE | Noted: 2023-07-07

## 2023-09-01 ENCOUNTER — OFFICE VISIT (OUTPATIENT)
Dept: PRIMARY CARE | Facility: CLINIC | Age: 83
End: 2023-09-01
Payer: MEDICARE

## 2023-09-01 VITALS
BODY MASS INDEX: 20.62 KG/M2 | OXYGEN SATURATION: 94 % | DIASTOLIC BLOOD PRESSURE: 80 MMHG | SYSTOLIC BLOOD PRESSURE: 150 MMHG | HEIGHT: 60 IN | HEART RATE: 89 BPM | TEMPERATURE: 97.4 F | WEIGHT: 105 LBS

## 2023-09-01 DIAGNOSIS — F51.01 PRIMARY INSOMNIA: Primary | ICD-10-CM

## 2023-09-01 DIAGNOSIS — B02.22 POST-HERPETIC TRIGEMINAL NEURALGIA: ICD-10-CM

## 2023-09-01 DIAGNOSIS — G14 POSTPOLIO SYNDROME (CMS-HCC): ICD-10-CM

## 2023-09-01 DIAGNOSIS — I10 PRIMARY HYPERTENSION: ICD-10-CM

## 2023-09-01 DIAGNOSIS — S72.001S CLOSED FRACTURE OF RIGHT HIP, SEQUELA: ICD-10-CM

## 2023-09-01 DIAGNOSIS — S72.009S CLOSED FRACTURE OF NECK OF FEMUR, UNSPECIFIED LATERALITY, SEQUELA: ICD-10-CM

## 2023-09-01 PROCEDURE — 3079F DIAST BP 80-89 MM HG: CPT | Performed by: FAMILY MEDICINE

## 2023-09-01 PROCEDURE — 1159F MED LIST DOCD IN RCRD: CPT | Performed by: FAMILY MEDICINE

## 2023-09-01 PROCEDURE — 64600 INJECTION TREATMENT OF NERVE: CPT | Performed by: FAMILY MEDICINE

## 2023-09-01 PROCEDURE — 99213 OFFICE O/P EST LOW 20 MIN: CPT | Performed by: FAMILY MEDICINE

## 2023-09-01 PROCEDURE — 3077F SYST BP >= 140 MM HG: CPT | Performed by: FAMILY MEDICINE

## 2023-09-01 PROCEDURE — 1125F AMNT PAIN NOTED PAIN PRSNT: CPT | Performed by: FAMILY MEDICINE

## 2023-09-01 ASSESSMENT — ENCOUNTER SYMPTOMS
CONSTIPATION: 1
ABDOMINAL PAIN: 0
DIARRHEA: 0
VOMITING: 0
DYSURIA: 0
PALPITATIONS: 0
FEVER: 0
FATIGUE: 0
DECREASED CONCENTRATION: 0
COUGH: 0
HEMATURIA: 0
UNEXPECTED WEIGHT CHANGE: 0
SORE THROAT: 0
SHORTNESS OF BREATH: 0
CONFUSION: 0
FREQUENCY: 0
HEADACHES: 0
DIZZINESS: 0
BLOOD IN STOOL: 0
HALLUCINATIONS: 0
NAUSEA: 0
TROUBLE SWALLOWING: 0
NUMBNESS: 0
JOINT SWELLING: 0
EYE PAIN: 0
WEAKNESS: 1

## 2023-09-01 ASSESSMENT — PAIN SCALES - GENERAL: PAINLEVEL: 7

## 2023-09-01 NOTE — PATIENT INSTRUCTIONS
It was nice to see you today!  Discussed current concerns and addressed   Reviewed recent labs and diagnostics  Reviewed medications list  Continue to eat a healthy diet, exercise at least 3 times a week or more  Plan and follow up discussed  For any further information related to your condition, copy and paste or go to [email or web address]    Colace 200mg twice daily, miralax and senna  Injections done.  Let me know if there is any redness, pus or unusual symptoms after.  Follow-up 3 months or as needed.  Getting around with post polio syndrome and hx hip and leg fractures

## 2023-09-01 NOTE — PROGRESS NOTES
Subjective   Patient ID: Selam Sanabria is a 83 y.o. female.    Selam is dealing with chronic constipation.  She has tried Linzess and just about everything over-the-counter.  We reviewed dosages.  She is doing senna, Colace and has not done MiraLAX lately.  She is drinking enough water and her diet seems to be very good.  She also has trigeminal neuralgia in the V1 distribution.  I have been doing supratrochlear nerve injections and has been helping quite a bit.  Is been 3 months since she would like another one.      Review of Systems   Constitutional:  Negative for fatigue, fever and unexpected weight change.   HENT:  Negative for congestion, ear pain, hearing loss, sore throat and trouble swallowing.    Eyes:  Negative for pain and visual disturbance.   Respiratory:  Negative for cough and shortness of breath.    Cardiovascular:  Negative for chest pain, palpitations and leg swelling.   Gastrointestinal:  Positive for constipation. Negative for abdominal pain, blood in stool, diarrhea, nausea and vomiting.   Genitourinary:  Negative for dysuria, frequency, hematuria and urgency.   Musculoskeletal:  Negative for joint swelling.   Skin:  Negative for pallor and rash.   Neurological:  Positive for weakness. Negative for dizziness, syncope, numbness and headaches.   Psychiatric/Behavioral:  Negative for confusion, decreased concentration, hallucinations and suicidal ideas.      Vitals:    09/01/23 1020   BP: 150/80   Pulse: 89   Temp: 36.3 °C (97.4 °F)   SpO2: 94%      Objective   Physical Exam  Constitutional:       Appearance: Normal appearance.   HENT:      Head:        Comments: Injected supratrochlear and supraorbital nerve distributions with 1/4cc each of 1/2cc kenalog 40mg/ml soln and .2cc of lidocaine  Cardiovascular:      Rate and Rhythm: Normal rate and regular rhythm.   Neurological:      Mental Status: She is alert and oriented to person, place, and time. Mental status is at baseline.   Psychiatric:          Mood and Affect: Mood normal.         Thought Content: Thought content normal.         Judgment: Judgment normal.         Assessment/Plan   There are no diagnoses linked to this encounter.

## 2023-09-06 DIAGNOSIS — I10 PRIMARY HYPERTENSION: Primary | ICD-10-CM

## 2023-09-06 RX ORDER — AMLODIPINE BESYLATE 5 MG/1
5 TABLET ORAL
Qty: 90 TABLET | Refills: 3 | Status: SHIPPED | OUTPATIENT
Start: 2023-09-06

## 2023-09-12 DIAGNOSIS — F41.1 GENERALIZED ANXIETY DISORDER: ICD-10-CM

## 2023-09-13 RX ORDER — CITALOPRAM 20 MG/1
20 TABLET, FILM COATED ORAL DAILY
Qty: 90 TABLET | Refills: 3 | Status: SHIPPED | OUTPATIENT
Start: 2023-09-13

## 2023-09-13 RX ORDER — METOPROLOL SUCCINATE 25 MG/1
25 TABLET, EXTENDED RELEASE ORAL DAILY
Qty: 90 TABLET | Refills: 3 | Status: SHIPPED | OUTPATIENT
Start: 2023-09-13

## 2023-10-30 DIAGNOSIS — M81.0 AGE-RELATED OSTEOPOROSIS WITHOUT CURRENT PATHOLOGICAL FRACTURE: Primary | ICD-10-CM

## 2023-10-30 DIAGNOSIS — S72.009S CLOSED FRACTURE OF NECK OF FEMUR, UNSPECIFIED LATERALITY, SEQUELA: Primary | ICD-10-CM

## 2023-10-31 RX ORDER — RALOXIFENE HYDROCHLORIDE 60 MG/1
60 TABLET, FILM COATED ORAL DAILY
Qty: 90 TABLET | Refills: 3 | Status: SHIPPED | OUTPATIENT
Start: 2023-10-31

## 2023-11-14 DIAGNOSIS — R79.89 LOW TSH LEVEL: ICD-10-CM

## 2023-11-15 RX ORDER — LIOTHYRONINE SODIUM 5 UG/1
5 TABLET ORAL 3 TIMES DAILY
Qty: 90 TABLET | Refills: 3 | Status: SHIPPED | OUTPATIENT
Start: 2023-11-15 | End: 2024-03-11

## 2023-12-05 ENCOUNTER — OFFICE VISIT (OUTPATIENT)
Dept: PRIMARY CARE | Facility: CLINIC | Age: 83
End: 2023-12-05
Payer: MEDICARE

## 2023-12-05 VITALS
WEIGHT: 112 LBS | DIASTOLIC BLOOD PRESSURE: 60 MMHG | OXYGEN SATURATION: 96 % | TEMPERATURE: 97.1 F | BODY MASS INDEX: 21.99 KG/M2 | SYSTOLIC BLOOD PRESSURE: 120 MMHG | HEIGHT: 60 IN | HEART RATE: 73 BPM

## 2023-12-05 DIAGNOSIS — M25.511 CHRONIC RIGHT SHOULDER PAIN: ICD-10-CM

## 2023-12-05 DIAGNOSIS — B37.9 CANDIDIASIS: Primary | ICD-10-CM

## 2023-12-05 DIAGNOSIS — G89.29 CHRONIC RIGHT SHOULDER PAIN: ICD-10-CM

## 2023-12-05 PROCEDURE — 1126F AMNT PAIN NOTED NONE PRSNT: CPT | Performed by: FAMILY MEDICINE

## 2023-12-05 PROCEDURE — 99213 OFFICE O/P EST LOW 20 MIN: CPT | Performed by: FAMILY MEDICINE

## 2023-12-05 PROCEDURE — 1159F MED LIST DOCD IN RCRD: CPT | Performed by: FAMILY MEDICINE

## 2023-12-05 PROCEDURE — 3078F DIAST BP <80 MM HG: CPT | Performed by: FAMILY MEDICINE

## 2023-12-05 PROCEDURE — 3074F SYST BP LT 130 MM HG: CPT | Performed by: FAMILY MEDICINE

## 2023-12-05 RX ORDER — VERAPAMIL HYDROCHLORIDE 120 MG/1
CAPSULE, EXTENDED RELEASE ORAL
COMMUNITY
Start: 2023-10-28

## 2023-12-05 RX ORDER — KETOCONAZOLE 20 MG/G
CREAM TOPICAL 2 TIMES DAILY
Qty: 45 G | Refills: 3 | Status: SHIPPED | OUTPATIENT
Start: 2023-12-05 | End: 2024-04-30 | Stop reason: ALTCHOICE

## 2023-12-05 RX ORDER — TRIAMCINOLONE ACETONIDE 40 MG/ML
40 INJECTION, SUSPENSION INTRA-ARTICULAR; INTRAMUSCULAR ONCE
Status: COMPLETED | OUTPATIENT
Start: 2023-12-05 | End: 2023-12-05

## 2023-12-05 RX ADMIN — TRIAMCINOLONE ACETONIDE 40 MG: 40 INJECTION, SUSPENSION INTRA-ARTICULAR; INTRAMUSCULAR at 11:46

## 2023-12-05 ASSESSMENT — ENCOUNTER SYMPTOMS
HALLUCINATIONS: 0
NUMBNESS: 0
TROUBLE SWALLOWING: 0
FEVER: 0
CONFUSION: 0
BACK PAIN: 1
DYSURIA: 0
COUGH: 0
PALPITATIONS: 0
ABDOMINAL PAIN: 0
HEMATURIA: 0
JOINT SWELLING: 0
FATIGUE: 0
EYE PAIN: 0
SORE THROAT: 0
BLOOD IN STOOL: 0
WEAKNESS: 0
UNEXPECTED WEIGHT CHANGE: 0
SHORTNESS OF BREATH: 0
VOMITING: 0
ARTHRALGIAS: 1
HEADACHES: 0
DIZZINESS: 0
FREQUENCY: 0
DECREASED CONCENTRATION: 0
DIARRHEA: 0
NAUSEA: 0

## 2023-12-05 ASSESSMENT — PAIN SCALES - GENERAL: PAINLEVEL: 0-NO PAIN

## 2023-12-05 NOTE — PATIENT INSTRUCTIONS
Shoulder injection.  Routine follow-up care and precautions discussed.  Will let me know if the pain gets worse, there is fever or chills.  Nizoral cream written.  Discussed oral acidophilus, cotton underwear and letting things air out.  She is not diabetic.

## 2023-12-05 NOTE — PROGRESS NOTES
Subjective   Patient ID: Selam Sanabria is a 83 y.o. female.    Patient has a history of right shoulder pain.  It is limiting her abduction.  She has been using Voltaren gel but she would like an injection.  She had one 3 months ago and it has been going great.  Multiple fractures, she is doing well.  She uses a walker.  Has vaginal yeast and would like ketoconazole topically.        Review of Systems   Constitutional:  Negative for fatigue, fever and unexpected weight change.   HENT:  Negative for congestion, ear pain, hearing loss, sore throat and trouble swallowing.    Eyes:  Negative for pain and visual disturbance.   Respiratory:  Negative for cough and shortness of breath.    Cardiovascular:  Negative for chest pain, palpitations and leg swelling.   Gastrointestinal:  Negative for abdominal pain, blood in stool, diarrhea, nausea and vomiting.   Genitourinary:  Negative for dysuria, frequency, hematuria and urgency.   Musculoskeletal:  Positive for arthralgias, back pain and gait problem. Negative for joint swelling.   Skin:  Negative for pallor and rash.   Neurological:  Negative for dizziness, syncope, weakness, numbness and headaches.   Psychiatric/Behavioral:  Negative for confusion, decreased concentration, hallucinations and suicidal ideas.      Vitals:    12/05/23 1047   BP: 120/60   Pulse: 73   Temp: 36.2 °C (97.1 °F)   SpO2: 96%      Objective   Physical Exam  Constitutional:       Appearance: Normal appearance.   Musculoskeletal:      Comments: R shoulder with limited abduction. No swelling or deformity. After obtaining written and verbal consent R lateral sub-acromial area cleaned with betadine x3. Injected with soln of 4cc bupivicaine, 4cc lido 1% without epinephrine, 1cc kenalog (40mg) with 22g 1 1/2 inch needle. Patient tolerated well.     Neurological:      Mental Status: She is alert.      Gait: Gait abnormal.         Assessment/Plan   Diagnoses and all orders for this visit:  Candidiasis  -      ketoconazole (NIZOral) 2 % cream; Apply topically 2 times a day.

## 2023-12-28 ENCOUNTER — ANCILLARY PROCEDURE (OUTPATIENT)
Dept: RADIOLOGY | Facility: CLINIC | Age: 83
End: 2023-12-28
Payer: MEDICARE

## 2023-12-28 DIAGNOSIS — Z12.31 SCREENING MAMMOGRAM FOR BREAST CANCER: ICD-10-CM

## 2023-12-28 PROCEDURE — 77063 BREAST TOMOSYNTHESIS BI: CPT | Performed by: STUDENT IN AN ORGANIZED HEALTH CARE EDUCATION/TRAINING PROGRAM

## 2023-12-28 PROCEDURE — 77067 SCR MAMMO BI INCL CAD: CPT

## 2023-12-28 PROCEDURE — 77067 SCR MAMMO BI INCL CAD: CPT | Performed by: STUDENT IN AN ORGANIZED HEALTH CARE EDUCATION/TRAINING PROGRAM

## 2024-01-04 ENCOUNTER — APPOINTMENT (OUTPATIENT)
Dept: PRIMARY CARE | Facility: CLINIC | Age: 84
End: 2024-01-04
Payer: MEDICARE

## 2024-01-15 ENCOUNTER — OFFICE VISIT (OUTPATIENT)
Dept: PRIMARY CARE | Facility: CLINIC | Age: 84
End: 2024-01-15
Payer: MEDICARE

## 2024-01-15 VITALS
DIASTOLIC BLOOD PRESSURE: 78 MMHG | HEART RATE: 63 BPM | OXYGEN SATURATION: 97 % | WEIGHT: 108 LBS | TEMPERATURE: 97.6 F | BODY MASS INDEX: 21.09 KG/M2 | SYSTOLIC BLOOD PRESSURE: 120 MMHG

## 2024-01-15 DIAGNOSIS — K22.4 ESOPHAGEAL SPASM: Primary | ICD-10-CM

## 2024-01-15 DIAGNOSIS — I10 PRIMARY HYPERTENSION: ICD-10-CM

## 2024-01-15 DIAGNOSIS — R26.9 GAIT ABNORMALITY: ICD-10-CM

## 2024-01-15 DIAGNOSIS — B02.22 POST-HERPETIC TRIGEMINAL NEURALGIA: ICD-10-CM

## 2024-01-15 PROBLEM — S82.899A CLOSED FRACTURE OF ANKLE: Status: RESOLVED | Noted: 2019-12-20 | Resolved: 2024-01-15

## 2024-01-15 PROBLEM — F51.01 PRIMARY INSOMNIA: Status: ACTIVE | Noted: 2023-02-15

## 2024-01-15 PROBLEM — Z86.16 HISTORY OF SEVERE ACUTE RESPIRATORY SYNDROME CORONAVIRUS 2 (SARS-COV-2) DISEASE: Status: ACTIVE | Noted: 2024-01-15

## 2024-01-15 PROBLEM — B37.9 CANDIDIASIS: Status: ACTIVE | Noted: 2024-01-15

## 2024-01-15 PROBLEM — K58.1 IRRITABLE BOWEL SYNDROME WITH CONSTIPATION: Status: ACTIVE | Noted: 2024-01-15

## 2024-01-15 PROBLEM — S82.899A CLOSED FRACTURE OF ANKLE: Status: ACTIVE | Noted: 2019-12-20

## 2024-01-15 PROBLEM — L81.9 DISORDER OF PIGMENTATION: Status: ACTIVE | Noted: 2023-07-07

## 2024-01-15 PROBLEM — M79.601 CHRONIC PAIN OF RIGHT UPPER EXTREMITY: Status: ACTIVE | Noted: 2024-01-15

## 2024-01-15 PROBLEM — S20.219A CONTUSION OF RIB: Status: ACTIVE | Noted: 2022-10-15

## 2024-01-15 PROBLEM — G89.29 CHRONIC PAIN OF RIGHT UPPER EXTREMITY: Status: ACTIVE | Noted: 2024-01-15
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
## 2024-01-15 PROBLEM — K59.09 CHRONIC CONSTIPATION: Status: ACTIVE | Noted: 2023-02-15

## 2024-01-15 PROBLEM — E03.9 ADULT HYPOTHYROIDISM: Status: RESOLVED | Noted: 2023-02-15 | Resolved: 2024-01-15

## 2024-01-15 PROBLEM — Z85.828 HISTORY OF MALIGNANT NEOPLASM OF SKIN: Status: ACTIVE | Noted: 2021-06-01

## 2024-01-15 PROBLEM — Z86.19 HISTORY OF HERPES ZOSTER: Status: ACTIVE | Noted: 2024-01-15

## 2024-01-15 PROBLEM — K63.5 POLYP OF COLON: Status: ACTIVE | Noted: 2023-02-15

## 2024-01-15 PROBLEM — S72.009A FRACTURE OF BONE OF HIP (MULTI): Status: ACTIVE | Noted: 2022-05-11

## 2024-01-15 PROBLEM — E03.9 HYPOTHYROIDISM: Status: ACTIVE | Noted: 2022-10-15

## 2024-01-15 PROBLEM — L90.5 SCAR CONDITIONS AND FIBROSIS OF SKIN: Status: ACTIVE | Noted: 2022-07-05

## 2024-01-15 PROBLEM — R79.89 LOW TSH LEVEL: Status: RESOLVED | Noted: 2023-02-15 | Resolved: 2024-01-15

## 2024-01-15 PROCEDURE — 1126F AMNT PAIN NOTED NONE PRSNT: CPT | Performed by: FAMILY MEDICINE

## 2024-01-15 PROCEDURE — 99213 OFFICE O/P EST LOW 20 MIN: CPT | Performed by: FAMILY MEDICINE

## 2024-01-15 PROCEDURE — 3074F SYST BP LT 130 MM HG: CPT | Performed by: FAMILY MEDICINE

## 2024-01-15 PROCEDURE — 3078F DIAST BP <80 MM HG: CPT | Performed by: FAMILY MEDICINE

## 2024-01-15 PROCEDURE — 64600 INJECTION TREATMENT OF NERVE: CPT | Performed by: FAMILY MEDICINE

## 2024-01-15 PROCEDURE — 96372 THER/PROPH/DIAG INJ SC/IM: CPT | Performed by: FAMILY MEDICINE

## 2024-01-15 RX ORDER — VALACYCLOVIR HYDROCHLORIDE 1 G/1
TABLET, FILM COATED ORAL
COMMUNITY
Start: 2019-12-10

## 2024-01-15 RX ORDER — FAMOTIDINE 40 MG/1
40 TABLET, FILM COATED ORAL 2 TIMES DAILY
Qty: 60 TABLET | Refills: 5 | Status: SHIPPED | OUTPATIENT
Start: 2024-01-15 | End: 2024-04-30 | Stop reason: ALTCHOICE

## 2024-01-15 RX ORDER — OLOPATADINE HYDROCHLORIDE 2 MG/ML
SOLUTION/ DROPS OPHTHALMIC
COMMUNITY
Start: 2020-03-16

## 2024-01-15 RX ORDER — TRIAMCINOLONE ACETONIDE 40 MG/ML
20 INJECTION, SUSPENSION INTRA-ARTICULAR; INTRAMUSCULAR ONCE
Status: COMPLETED | OUTPATIENT
Start: 2024-01-15 | End: 2024-01-15

## 2024-01-15 RX ADMIN — TRIAMCINOLONE ACETONIDE 20 MG: 40 INJECTION, SUSPENSION INTRA-ARTICULAR; INTRAMUSCULAR at 12:10

## 2024-01-15 ASSESSMENT — ENCOUNTER SYMPTOMS
SORE THROAT: 0
HEMATURIA: 0
NAUSEA: 0
DECREASED CONCENTRATION: 0
VOMITING: 0
PALPITATIONS: 0
FATIGUE: 0
BLOOD IN STOOL: 0
NUMBNESS: 0
DYSURIA: 0
SHORTNESS OF BREATH: 0
FEVER: 0
HEADACHES: 1
EYE PAIN: 0
DIARRHEA: 0
COUGH: 0
TROUBLE SWALLOWING: 0
WEAKNESS: 0
ABDOMINAL PAIN: 0
ARTHRALGIAS: 1
UNEXPECTED WEIGHT CHANGE: 0
DIZZINESS: 0
HALLUCINATIONS: 0
JOINT SWELLING: 0
FREQUENCY: 0
CONFUSION: 0

## 2024-01-15 ASSESSMENT — PAIN SCALES - GENERAL: PAINLEVEL: 0-NO PAIN

## 2024-01-15 NOTE — PATIENT INSTRUCTIONS
It seems like you are pain in your lower chest are most likely due to esophagus spasm from reflux or irritation.  Please try and decrease the amount of citrus fruits, vinegar and other acid in your diet  Blood pressure is at goal  Injections placed in the forehead with good relief.  Yes, I was out of the office Friday but I will get to them today  I will rx pepcid for nightly, x 6 weeks  If it worsens or relentless to the ER

## 2024-01-15 NOTE — PROGRESS NOTES
Subjective   Patient ID: Selam Sanabria is a 83 y.o. female.    Patient comes in today for multiple issues.  I reviewed her medical history and we addressed some of it.  She has a history of hypertension and that is at goal.  She has trigeminal neuralgia and gets shots in the right upper brow which helped tremendously.  She has tried multiple medications which did not help and gave her side effects.  She is having a few episodes of sharp type pain in the lower sternal, epigastric area.  It lasts a few minutes and then goes away.  It is with rest.  There is no shortness of breath, dizziness, syncope or any other symptoms.  Echocardiogram was normal a few years ago and she has no cardiac history.  She has no significant risk factors.  She has never smoked.  She is getting along with her arthralgias due to many fractures from osteoporosis.  Her gait is unstable.      Review of Systems   Constitutional:  Negative for fatigue, fever and unexpected weight change.   HENT:  Negative for congestion, ear pain, hearing loss, sore throat and trouble swallowing.    Eyes:  Negative for pain and visual disturbance.   Respiratory:  Negative for cough and shortness of breath.    Cardiovascular:  Negative for chest pain, palpitations and leg swelling.   Gastrointestinal:  Negative for abdominal pain, blood in stool, diarrhea, nausea and vomiting.   Genitourinary:  Negative for dysuria, frequency, hematuria and urgency.   Musculoskeletal:  Positive for arthralgias and gait problem. Negative for joint swelling.   Skin:  Negative for pallor and rash.   Neurological:  Positive for headaches. Negative for dizziness, syncope, weakness and numbness.   Psychiatric/Behavioral:  Negative for confusion, decreased concentration, hallucinations and suicidal ideas.      Vitals:    01/15/24 1007   BP: 120/78   Pulse: 63   Temp: 36.4 °C (97.6 °F)   SpO2: 97%      Objective   Physical Exam  Constitutional:       Appearance: Normal appearance.    HENT:      Head:        Comments: Areas marked above were cleaned with alcohol swab, injected about 1/4 cc of a mixture of 1/2 cc Kenalog and half cc of lidocaine with epinephrine.  Cardiovascular:      Rate and Rhythm: Normal rate and regular rhythm.      Heart sounds: Normal heart sounds.   Pulmonary:      Effort: Pulmonary effort is normal.      Breath sounds: Normal breath sounds.   Abdominal:      Comments: There is epigastric tenderness.  No guarding.  Normal bowel sounds.   Musculoskeletal:      Cervical back: Neck supple.   Skin:     General: Skin is warm and dry.   Neurological:      General: No focal deficit present.      Mental Status: She is alert.      Gait: Gait abnormal.   Psychiatric:         Mood and Affect: Mood normal.         Speech: Speech normal.         Behavior: Behavior normal.         Cognition and Memory: Cognition normal.         Assessment/Plan   Diagnoses and all orders for this visit:  Esophageal spasm  -     famotidine (Pepcid) 40 mg tablet; Take 1 tablet (40 mg) by mouth 2 times a day.  Primary hypertension  Post-herpetic trigeminal neuralgia  Gait abnormality

## 2024-01-17 DIAGNOSIS — R92.8 ABNORMAL MAMMOGRAM OF RIGHT BREAST: ICD-10-CM

## 2024-01-25 DIAGNOSIS — R92.8 ABNORMAL SCREENING MAMMOGRAM: Primary | ICD-10-CM

## 2024-01-31 ENCOUNTER — OFFICE VISIT (OUTPATIENT)
Dept: PRIMARY CARE | Facility: CLINIC | Age: 84
End: 2024-01-31
Payer: MEDICARE

## 2024-01-31 DIAGNOSIS — I10 PRIMARY HYPERTENSION: Primary | ICD-10-CM

## 2024-01-31 DIAGNOSIS — Z00.00 ROUTINE GENERAL MEDICAL EXAMINATION AT HEALTH CARE FACILITY: ICD-10-CM

## 2024-01-31 DIAGNOSIS — E55.9 VITAMIN D DEFICIENCY: ICD-10-CM

## 2024-01-31 DIAGNOSIS — G14 POSTPOLIO SYNDROME (CMS-HCC): ICD-10-CM

## 2024-01-31 DIAGNOSIS — B02.22 POST-HERPETIC TRIGEMINAL NEURALGIA: ICD-10-CM

## 2024-01-31 DIAGNOSIS — D64.9 ANEMIA, UNSPECIFIED TYPE: ICD-10-CM

## 2024-01-31 DIAGNOSIS — M81.0 AGE-RELATED OSTEOPOROSIS WITHOUT CURRENT PATHOLOGICAL FRACTURE: ICD-10-CM

## 2024-01-31 DIAGNOSIS — E03.9 ACQUIRED HYPOTHYROIDISM: ICD-10-CM

## 2024-01-31 DIAGNOSIS — E78.5 DYSLIPIDEMIA: ICD-10-CM

## 2024-01-31 PROCEDURE — 1126F AMNT PAIN NOTED NONE PRSNT: CPT | Performed by: FAMILY MEDICINE

## 2024-01-31 PROCEDURE — 1159F MED LIST DOCD IN RCRD: CPT | Performed by: FAMILY MEDICINE

## 2024-01-31 PROCEDURE — 1157F ADVNC CARE PLAN IN RCRD: CPT | Performed by: FAMILY MEDICINE

## 2024-01-31 PROCEDURE — 1160F RVW MEDS BY RX/DR IN RCRD: CPT | Performed by: FAMILY MEDICINE

## 2024-01-31 PROCEDURE — 1170F FXNL STATUS ASSESSED: CPT | Performed by: FAMILY MEDICINE

## 2024-01-31 PROCEDURE — G0439 PPPS, SUBSEQ VISIT: HCPCS | Performed by: FAMILY MEDICINE

## 2024-01-31 ASSESSMENT — ENCOUNTER SYMPTOMS
DIZZINESS: 0
ABDOMINAL PAIN: 0
WEAKNESS: 0
HEADACHES: 0
FEVER: 0
UNEXPECTED WEIGHT CHANGE: 0
JOINT SWELLING: 0
EYE PAIN: 0
FATIGUE: 0
COUGH: 0
BLOOD IN STOOL: 0
DIARRHEA: 0
NUMBNESS: 0
CONFUSION: 0
DYSURIA: 0
ARTHRALGIAS: 1
MYALGIAS: 1
TROUBLE SWALLOWING: 0
NAUSEA: 0
HEMATURIA: 0
DECREASED CONCENTRATION: 0
SORE THROAT: 0
VOMITING: 0
SHORTNESS OF BREATH: 0
PALPITATIONS: 0
HALLUCINATIONS: 0
FREQUENCY: 0

## 2024-01-31 ASSESSMENT — ACTIVITIES OF DAILY LIVING (ADL)
GROCERY_SHOPPING: INDEPENDENT
DRESSING: INDEPENDENT
DOING_HOUSEWORK: INDEPENDENT
MANAGING_FINANCES: INDEPENDENT
BATHING: INDEPENDENT
TAKING_MEDICATION: INDEPENDENT

## 2024-01-31 NOTE — PROGRESS NOTES
Subjective   Reason for Visit: Selam Sanabria is an 83 y.o. female here for a Medicare Wellness visit.          Reviewed all medications by prescribing practitioner or clinical pharmacist (such as prescriptions, OTCs, herbal therapies and supplements) and documented in the medical record.    Selam Sanabria comes in today for medicare wellness.  There has been no chest pain, shortness of breath, fever, chills, unexplained weight loss, rectal bleeding or any other unusual symptoms.     Hx polio, leg fx. All stable.  Her biggest complaint is trigeminal neuralgia that she had after shingles.  She is on gabapentin but it is tough to tolerate.  I have been doing supraorbital and supratrochlear nerve injections with Kenalog and lidocaine.  They relieved about 75% of her pain for few months and then it returns.  It is stabbing.  She puts lidocaine topically on it and it helps.      Recent labs, diagnostics and pertinent information has been reviewed. Due for recheck in May. Patient is attempting to eat a healthy diet and incorporate exercise in to their lifestyle. Patient does not smoke.  Patient is practicing routine eye and dental care. Reviewed employment status. Reviewed current medications, if any. Immunizations reviewed and updated if appropriate.          Patient Care Team:  Edilia Jeffries MD as PCP - General  Edilia Jeffries MD as PCP [facility]ributed Provider     Review of Systems   Constitutional:  Negative for fatigue, fever and unexpected weight change.   HENT:  Negative for congestion, ear pain, hearing loss, sore throat and trouble swallowing.         Facial pain over the right brow up to the hairline on the forehead   Eyes:  Negative for pain and visual disturbance.   Respiratory:  Negative for cough and shortness of breath.    Cardiovascular:  Negative for chest pain, palpitations and leg swelling.   Gastrointestinal:  Negative for abdominal pain, blood in stool, diarrhea, nausea and vomiting.    Genitourinary:  Negative for dysuria, frequency, hematuria and urgency.   Musculoskeletal:  Positive for arthralgias, gait problem and myalgias. Negative for joint swelling.   Skin:  Negative for pallor and rash.   Neurological:  Negative for dizziness, syncope, weakness, numbness and headaches.   Psychiatric/Behavioral:  Negative for confusion, decreased concentration, hallucinations and suicidal ideas.        Objective   Vitals:  LMP  (LMP Unknown)       Physical Exam  Constitutional:       Appearance: Normal appearance.   HENT:      Head: Normocephalic and atraumatic.      Right Ear: Tympanic membrane and ear canal normal.      Left Ear: Tympanic membrane and ear canal normal.   Cardiovascular:      Rate and Rhythm: Normal rate and regular rhythm.      Heart sounds: Normal heart sounds.   Pulmonary:      Effort: Pulmonary effort is normal.      Breath sounds: Normal breath sounds.   Musculoskeletal:      Cervical back: Neck supple.   Skin:     General: Skin is warm and dry.   Neurological:      General: No focal deficit present.      Mental Status: She is alert.   Psychiatric:         Mood and Affect: Mood normal.         Speech: Speech normal.         Behavior: Behavior normal.         Cognition and Memory: Cognition normal.         Assessment/Plan   Problem List Items Addressed This Visit    None

## 2024-01-31 NOTE — PATIENT INSTRUCTIONS
It was nice to see you today!  Discussed current concerns and addressed   Reviewed recent labs and diagnostics  Reviewed medications list  Continue to eat a healthy diet, exercise at least 3 times a week or more  Plan and follow up discussed  For any further information related to your condition, copy and paste or go to [email or web address]     I will message a pain management colleague to see if she would be a candidate for a nerve ablation in the V1 distribution.

## 2024-02-07 ENCOUNTER — HOSPITAL ENCOUNTER (OUTPATIENT)
Dept: RADIOLOGY | Facility: CLINIC | Age: 84
Discharge: HOME | End: 2024-02-07
Payer: MEDICARE

## 2024-02-07 DIAGNOSIS — R92.8 ABNORMAL MAMMOGRAM OF RIGHT BREAST: ICD-10-CM

## 2024-02-07 PROCEDURE — 77065 DX MAMMO INCL CAD UNI: CPT | Mod: RIGHT SIDE | Performed by: RADIOLOGY

## 2024-02-07 PROCEDURE — 76642 ULTRASOUND BREAST LIMITED: CPT | Mod: RIGHT SIDE | Performed by: RADIOLOGY

## 2024-02-07 PROCEDURE — 76642 ULTRASOUND BREAST LIMITED: CPT | Mod: RT

## 2024-02-07 PROCEDURE — G0279 TOMOSYNTHESIS, MAMMO: HCPCS | Mod: RIGHT SIDE | Performed by: RADIOLOGY

## 2024-02-07 PROCEDURE — 77061 BREAST TOMOSYNTHESIS UNI: CPT | Mod: RT

## 2024-02-09 ENCOUNTER — HOSPITAL ENCOUNTER (EMERGENCY)
Facility: HOSPITAL | Age: 84
Discharge: HOME | End: 2024-02-09
Attending: EMERGENCY MEDICINE
Payer: MEDICARE

## 2024-02-09 ENCOUNTER — APPOINTMENT (OUTPATIENT)
Dept: RADIOLOGY | Facility: HOSPITAL | Age: 84
End: 2024-02-09
Payer: MEDICARE

## 2024-02-09 VITALS
SYSTOLIC BLOOD PRESSURE: 136 MMHG | WEIGHT: 108 LBS | HEART RATE: 74 BPM | TEMPERATURE: 97 F | DIASTOLIC BLOOD PRESSURE: 68 MMHG | OXYGEN SATURATION: 99 % | BODY MASS INDEX: 22.67 KG/M2 | RESPIRATION RATE: 16 BRPM | HEIGHT: 58 IN

## 2024-02-09 DIAGNOSIS — M25.562 ACUTE PAIN OF LEFT KNEE: ICD-10-CM
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
## 2024-02-09 DIAGNOSIS — R07.81 RIB PAIN ON RIGHT SIDE: ICD-10-CM

## 2024-02-09 PROCEDURE — 73564 X-RAY EXAM KNEE 4 OR MORE: CPT | Mod: LT

## 2024-02-09 PROCEDURE — 99284 EMERGENCY DEPT VISIT MOD MDM: CPT | Performed by: EMERGENCY MEDICINE

## 2024-02-09 PROCEDURE — 73564 X-RAY EXAM KNEE 4 OR MORE: CPT | Mod: LEFT SIDE | Performed by: RADIOLOGY

## 2024-02-09 PROCEDURE — 71101 X-RAY EXAM UNILAT RIBS/CHEST: CPT | Mod: RIGHT SIDE | Performed by: RADIOLOGY

## 2024-02-09 PROCEDURE — 71101 X-RAY EXAM UNILAT RIBS/CHEST: CPT | Mod: RT

## 2024-02-09 PROCEDURE — 2500000001 HC RX 250 WO HCPCS SELF ADMINISTERED DRUGS (ALT 637 FOR MEDICARE OP)

## 2024-02-09 PROCEDURE — 2500000005 HC RX 250 GENERAL PHARMACY W/O HCPCS

## 2024-02-09 RX ORDER — LIDOCAINE 560 MG/1
1 PATCH PERCUTANEOUS; TOPICAL; TRANSDERMAL DAILY
Status: DISCONTINUED | OUTPATIENT
Start: 2024-02-09 | End: 2024-02-09 | Stop reason: HOSPADM

## 2024-02-09 RX ORDER — IBUPROFEN 600 MG/1
600 TABLET ORAL ONCE
Status: COMPLETED | OUTPATIENT
Start: 2024-02-09 | End: 2024-02-09

## 2024-02-09 RX ORDER — TRAMADOL HYDROCHLORIDE 50 MG/1
50 TABLET ORAL EVERY 6 HOURS PRN
Qty: 10 TABLET | Refills: 0 | Status: SHIPPED | OUTPATIENT
Start: 2024-02-09 | End: 2024-02-12

## 2024-02-09 RX ORDER — METHOCARBAMOL 500 MG/1
500 TABLET, FILM COATED ORAL 3 TIMES DAILY
Qty: 21 TABLET | Refills: 0 | Status: SHIPPED | OUTPATIENT
Start: 2024-02-09 | End: 2024-02-16

## 2024-02-09 RX ORDER — LIDOCAINE 560 MG/1
1 PATCH PERCUTANEOUS; TOPICAL; TRANSDERMAL DAILY
Qty: 5 PATCH | Refills: 0 | Status: SHIPPED | OUTPATIENT
Start: 2024-02-09 | End: 2024-02-19

## 2024-02-09 RX ORDER — IBUPROFEN 600 MG/1
600 TABLET ORAL EVERY 8 HOURS PRN
Qty: 30 TABLET | Refills: 0 | Status: SHIPPED | OUTPATIENT
Start: 2024-02-09

## 2024-02-09 RX ADMIN — LIDOCAINE 1 PATCH: 4 PATCH TOPICAL at 14:30

## 2024-02-09 RX ADMIN — IBUPROFEN 600 MG: 600 TABLET, FILM COATED ORAL at 14:30

## 2024-02-09 ASSESSMENT — PAIN - FUNCTIONAL ASSESSMENT
PAIN_FUNCTIONAL_ASSESSMENT: 0-10
PAIN_FUNCTIONAL_ASSESSMENT: 0-10

## 2024-02-09 ASSESSMENT — PAIN DESCRIPTION - PROGRESSION: CLINICAL_PROGRESSION: NOT CHANGED

## 2024-02-09 ASSESSMENT — PAIN DESCRIPTION - ORIENTATION: ORIENTATION: RIGHT

## 2024-02-09 ASSESSMENT — PAIN SCALES - GENERAL
PAINLEVEL_OUTOF10: 6
PAINLEVEL_OUTOF10: 4

## 2024-02-09 ASSESSMENT — PAIN DESCRIPTION - LOCATION: LOCATION: RIB CAGE
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
## 2024-02-13 ENCOUNTER — APPOINTMENT (OUTPATIENT)
Dept: PRIMARY CARE | Facility: CLINIC | Age: 84
End: 2024-02-13
Payer: MEDICARE

## 2024-03-05 ENCOUNTER — APPOINTMENT (OUTPATIENT)
Dept: PRIMARY CARE | Facility: CLINIC | Age: 84
End: 2024-03-05
Payer: MEDICARE

## 2024-03-09 DIAGNOSIS — R79.89 LOW TSH LEVEL: ICD-10-CM

## 2024-03-11 RX ORDER — LIOTHYRONINE SODIUM 5 UG/1
5 TABLET ORAL 3 TIMES DAILY
Qty: 270 TABLET | Refills: 1 | Status: SHIPPED | OUTPATIENT
Start: 2024-03-11

## 2024-04-22 ENCOUNTER — LAB (OUTPATIENT)
Dept: LAB | Facility: LAB | Age: 84
End: 2024-04-22
Payer: MEDICARE

## 2024-04-22 DIAGNOSIS — D64.9 ANEMIA, UNSPECIFIED TYPE: ICD-10-CM

## 2024-04-22 DIAGNOSIS — M81.0 AGE-RELATED OSTEOPOROSIS WITHOUT CURRENT PATHOLOGICAL FRACTURE: ICD-10-CM

## 2024-04-22 DIAGNOSIS — E78.5 DYSLIPIDEMIA: ICD-10-CM

## 2024-04-22 DIAGNOSIS — E55.9 VITAMIN D DEFICIENCY: ICD-10-CM

## 2024-04-22 DIAGNOSIS — E03.9 ACQUIRED HYPOTHYROIDISM: ICD-10-CM

## 2024-04-22 LAB
25(OH)D3 SERPL-MCNC: 57 NG/ML (ref 30–100)
ALBUMIN SERPL BCP-MCNC: 4 G/DL (ref 3.4–5)
ALP SERPL-CCNC: 56 U/L (ref 33–136)
ALT SERPL W P-5'-P-CCNC: 11 U/L (ref 7–45)
ANION GAP SERPL CALC-SCNC: 11 MMOL/L (ref 10–20)
AST SERPL W P-5'-P-CCNC: 18 U/L (ref 9–39)
BASOPHILS # BLD AUTO: 0.05 X10*3/UL (ref 0–0.1)
BASOPHILS NFR BLD AUTO: 1 %
BILIRUB SERPL-MCNC: 0.6 MG/DL (ref 0–1.2)
BUN SERPL-MCNC: 20 MG/DL (ref 6–23)
CALCIUM SERPL-MCNC: 9.7 MG/DL (ref 8.6–10.6)
CHLORIDE SERPL-SCNC: 101 MMOL/L (ref 98–107)
CHOLEST SERPL-MCNC: 182 MG/DL (ref 0–199)
CHOLESTEROL/HDL RATIO: 1.9
CO2 SERPL-SCNC: 30 MMOL/L (ref 21–32)
CREAT SERPL-MCNC: 0.69 MG/DL (ref 0.5–1.05)
EGFRCR SERPLBLD CKD-EPI 2021: 86 ML/MIN/1.73M*2
EOSINOPHIL # BLD AUTO: 0.3 X10*3/UL (ref 0–0.4)
EOSINOPHIL NFR BLD AUTO: 6.1 %
ERYTHROCYTE [DISTWIDTH] IN BLOOD BY AUTOMATED COUNT: 13.5 % (ref 11.5–14.5)
GLUCOSE SERPL-MCNC: 83 MG/DL (ref 74–99)
HCT VFR BLD AUTO: 38 % (ref 36–46)
HDLC SERPL-MCNC: 95.8 MG/DL
HGB BLD-MCNC: 12.4 G/DL (ref 12–16)
IMM GRANULOCYTES # BLD AUTO: 0.02 X10*3/UL (ref 0–0.5)
IMM GRANULOCYTES NFR BLD AUTO: 0.4 % (ref 0–0.9)
LDLC SERPL CALC-MCNC: 75 MG/DL
LYMPHOCYTES # BLD AUTO: 1.57 X10*3/UL (ref 0.8–3)
LYMPHOCYTES NFR BLD AUTO: 31.8 %
MCH RBC QN AUTO: 30.3 PG (ref 26–34)
MCHC RBC AUTO-ENTMCNC: 32.6 G/DL (ref 32–36)
MCV RBC AUTO: 93 FL (ref 80–100)
MONOCYTES # BLD AUTO: 0.62 X10*3/UL (ref 0.05–0.8)
MONOCYTES NFR BLD AUTO: 12.6 %
NEUTROPHILS # BLD AUTO: 2.38 X10*3/UL (ref 1.6–5.5)
NEUTROPHILS NFR BLD AUTO: 48.1 %
NON HDL CHOLESTEROL: 86 MG/DL (ref 0–149)
NRBC BLD-RTO: 0 /100 WBCS (ref 0–0)
PLATELET # BLD AUTO: 244 X10*3/UL (ref 150–450)
POTASSIUM SERPL-SCNC: 4.4 MMOL/L (ref 3.5–5.3)
PROT SERPL-MCNC: 6.4 G/DL (ref 6.4–8.2)
RBC # BLD AUTO: 4.09 X10*6/UL (ref 4–5.2)
SODIUM SERPL-SCNC: 138 MMOL/L (ref 136–145)
TRIGL SERPL-MCNC: 56 MG/DL (ref 0–149)
TSH SERPL-ACNC: 0.75 MIU/L (ref 0.44–3.98)
VLDL: 11 MG/DL (ref 0–40)
WBC # BLD AUTO: 4.9 X10*3/UL (ref 4.4–11.3)

## 2024-04-22 PROCEDURE — 80061 LIPID PANEL: CPT

## 2024-04-22 PROCEDURE — 80053 COMPREHEN METABOLIC PANEL: CPT

## 2024-04-22 PROCEDURE — 84443 ASSAY THYROID STIM HORMONE: CPT

## 2024-04-22 PROCEDURE — 82306 VITAMIN D 25 HYDROXY: CPT

## 2024-04-22 PROCEDURE — 85025 COMPLETE CBC W/AUTO DIFF WBC: CPT

## 2024-04-22 PROCEDURE — 36415 COLL VENOUS BLD VENIPUNCTURE: CPT

## 2024-04-28 DIAGNOSIS — E03.9 ADULT HYPOTHYROIDISM: ICD-10-CM

## 2024-04-29 RX ORDER — LEVOTHYROXINE SODIUM 50 UG/1
50 TABLET ORAL
Qty: 90 TABLET | Refills: 3 | Status: SHIPPED | OUTPATIENT
Start: 2024-04-29

## 2024-04-30 ENCOUNTER — OFFICE VISIT (OUTPATIENT)
Dept: PRIMARY CARE | Facility: CLINIC | Age: 84
End: 2024-04-30
Payer: MEDICARE

## 2024-04-30 VITALS
DIASTOLIC BLOOD PRESSURE: 70 MMHG | WEIGHT: 110 LBS | TEMPERATURE: 98 F | HEART RATE: 68 BPM | BODY MASS INDEX: 22.99 KG/M2 | OXYGEN SATURATION: 98 % | SYSTOLIC BLOOD PRESSURE: 110 MMHG

## 2024-04-30 DIAGNOSIS — S92.314D NONDISPLACED FRACTURE OF FIRST METATARSAL BONE, RIGHT FOOT, SUBSEQUENT ENCOUNTER FOR FRACTURE WITH ROUTINE HEALING: ICD-10-CM

## 2024-04-30 DIAGNOSIS — M81.0 AGE-RELATED OSTEOPOROSIS WITHOUT CURRENT PATHOLOGICAL FRACTURE: ICD-10-CM

## 2024-04-30 DIAGNOSIS — M62.81 MUSCLE WEAKNESS (GENERALIZED): ICD-10-CM

## 2024-04-30 DIAGNOSIS — F51.01 PRIMARY INSOMNIA: Primary | ICD-10-CM

## 2024-04-30 PROCEDURE — 1157F ADVNC CARE PLAN IN RCRD: CPT | Performed by: FAMILY MEDICINE

## 2024-04-30 PROCEDURE — 1123F ACP DISCUSS/DSCN MKR DOCD: CPT | Performed by: FAMILY MEDICINE

## 2024-04-30 PROCEDURE — 99214 OFFICE O/P EST MOD 30 MIN: CPT | Performed by: FAMILY MEDICINE

## 2024-04-30 PROCEDURE — 3074F SYST BP LT 130 MM HG: CPT | Performed by: FAMILY MEDICINE

## 2024-04-30 PROCEDURE — 1158F ADVNC CARE PLAN TLK DOCD: CPT | Performed by: FAMILY MEDICINE

## 2024-04-30 PROCEDURE — G0009 ADMIN PNEUMOCOCCAL VACCINE: HCPCS | Performed by: FAMILY MEDICINE

## 2024-04-30 PROCEDURE — 3078F DIAST BP <80 MM HG: CPT | Performed by: FAMILY MEDICINE

## 2024-04-30 PROCEDURE — 64600 INJECTION TREATMENT OF NERVE: CPT | Performed by: FAMILY MEDICINE

## 2024-04-30 PROCEDURE — 90677 PCV20 VACCINE IM: CPT | Performed by: FAMILY MEDICINE

## 2024-04-30 PROCEDURE — 1159F MED LIST DOCD IN RCRD: CPT | Performed by: FAMILY MEDICINE

## 2024-04-30 PROCEDURE — 1160F RVW MEDS BY RX/DR IN RCRD: CPT | Performed by: FAMILY MEDICINE

## 2024-04-30 RX ORDER — MIRTAZAPINE 7.5 MG/1
7.5 TABLET, FILM COATED ORAL NIGHTLY
Qty: 90 TABLET | Refills: 1 | Status: SHIPPED | OUTPATIENT
Start: 2024-04-30 | End: 2024-07-29

## 2024-04-30 RX ORDER — GABAPENTIN 300 MG/1
300 CAPSULE ORAL 4 TIMES DAILY
Qty: 360 CAPSULE | Refills: 1 | Status: SHIPPED | OUTPATIENT
Start: 2024-04-30

## 2024-04-30 ASSESSMENT — ENCOUNTER SYMPTOMS
TROUBLE SWALLOWING: 0
BLOOD IN STOOL: 0
SORE THROAT: 0
NAUSEA: 0
FEVER: 0
HEMATURIA: 0
COUGH: 0
SLEEP DISTURBANCE: 1
ABDOMINAL PAIN: 0
EYE PAIN: 0
UNEXPECTED WEIGHT CHANGE: 0
DECREASED CONCENTRATION: 0
ARTHRALGIAS: 1
HALLUCINATIONS: 0
CONFUSION: 0
DIARRHEA: 0
SHORTNESS OF BREATH: 0
JOINT SWELLING: 0
PALPITATIONS: 0
HEADACHES: 0
VOMITING: 0
FATIGUE: 0
DYSURIA: 0
WEAKNESS: 0
DYSPHORIC MOOD: 1
DIZZINESS: 0
NUMBNESS: 0
FREQUENCY: 0

## 2024-04-30 NOTE — PROGRESS NOTES
Subjective   Patient ID: Selam Sanabria is a 84 y.o. female.    Patient is here to get another injection in the right supraorbital and supratrochlear nerve area.  It has been chronically painful since she had shingles.  She is on gabapentin 300 mg 4 times a day and applies topical lidocaine about 4 times a day.  The shots helped tremendously.  She has insomnia and Remeron is helping and we will refill it.  She has osteoporosis and has not had a bone density in years.  She is due for Prevnar vaccine she has had fracture of the femur, ankle and has an artificial right hip.        Review of Systems   Constitutional:  Negative for fatigue, fever and unexpected weight change.   HENT:  Negative for congestion, ear pain, hearing loss, sore throat and trouble swallowing.    Eyes:  Negative for pain and visual disturbance.   Respiratory:  Negative for cough and shortness of breath.    Cardiovascular:  Negative for chest pain, palpitations and leg swelling.   Gastrointestinal:  Negative for abdominal pain, blood in stool, diarrhea, nausea and vomiting.   Genitourinary:  Negative for dysuria, frequency, hematuria and urgency.   Musculoskeletal:  Positive for arthralgias and gait problem. Negative for joint swelling.   Skin:  Negative for pallor and rash.   Neurological:  Negative for dizziness, syncope, weakness, numbness and headaches.   Psychiatric/Behavioral:  Positive for dysphoric mood and sleep disturbance. Negative for confusion, decreased concentration, hallucinations and suicidal ideas.      Vitals:    04/30/24 1007   BP: 110/70   Pulse: 68   Temp: 36.7 °C (98 °F)   SpO2: 98%      Objective   Physical Exam  Constitutional:       Appearance: Normal appearance.   HENT:      Head:        Comments: A mixture of 20 mg of Kenalog totaling 1/2 cc, half cc of 2% lidocaine without epinephrine.  Half cc of the solution was injected over the supraorbital nerve distribution and the other half was injected over the supratrochlear  nerve distribution as noted above in the picture.  Cardiovascular:      Rate and Rhythm: Normal rate and regular rhythm.      Heart sounds: Normal heart sounds.   Pulmonary:      Effort: Pulmonary effort is normal.      Breath sounds: Normal breath sounds.   Musculoskeletal:      Cervical back: Neck supple.   Skin:     General: Skin is warm and dry.   Neurological:      Mental Status: She is alert and oriented to person, place, and time.      Motor: Weakness present.      Gait: Gait abnormal.   Psychiatric:         Mood and Affect: Mood normal.         Speech: Speech normal.         Behavior: Behavior normal.         Cognition and Memory: Cognition normal.         Assessment/Plan   Diagnoses and all orders for this visit:  Primary insomnia  -     mirtazapine (Remeron) 7.5 mg tablet; Take 1 tablet (7.5 mg) by mouth once daily at bedtime.  Nondisplaced fracture of first metatarsal bone, right foot, subsequent encounter for fracture with routine healing  -     gabapentin (Neurontin) 300 mg capsule; Take 1 capsule (300 mg) by mouth 4 times a day.  Muscle weakness (generalized)  -     gabapentin (Neurontin) 300 mg capsule; Take 1 capsule (300 mg) by mouth 4 times a day.  Age-related osteoporosis without current pathological fracture  -     gabapentin (Neurontin) 300 mg capsule; Take 1 capsule (300 mg) by mouth 4 times a day.  -     XR DEXA bone density; Future  Other orders  -     Pneumococcal conjugate vaccine, 20-valent (PREVNAR 20)

## 2024-04-30 NOTE — PATIENT INSTRUCTIONS
It was nice to see you today!  Discussed current concerns and addressed   Reviewed recent labs and diagnostics  Reviewed medications list  Continue to eat a healthy diet, exercise at least 3 times a week or more  Plan and follow up discussed  For any further information related to your condition, copy and paste or go to [email or web address]  Injection today.  Routine follow-up care discussed  Get bone density  Reviewed labs.

## 2024-05-08 DIAGNOSIS — B37.9 CANDIDIASIS: ICD-10-CM

## 2024-05-09 RX ORDER — NYSTATIN AND TRIAMCINOLONE ACETONIDE 100000; 1 [USP'U]/G; MG/G
CREAM TOPICAL 2 TIMES DAILY
Qty: 30 G | Refills: 1 | Status: SHIPPED | OUTPATIENT
Start: 2024-05-09

## 2024-06-16 DIAGNOSIS — F41.1 GENERALIZED ANXIETY DISORDER: ICD-10-CM

## 2024-06-17 RX ORDER — METOPROLOL SUCCINATE 25 MG/1
25 TABLET, EXTENDED RELEASE ORAL DAILY
Qty: 90 TABLET | Refills: 3 | Status: SHIPPED | OUTPATIENT
Start: 2024-06-17

## 2024-07-02 ENCOUNTER — APPOINTMENT (OUTPATIENT)
Dept: RADIOLOGY | Facility: CLINIC | Age: 84
End: 2024-07-02
Payer: MEDICARE

## 2024-07-12 ENCOUNTER — APPOINTMENT (OUTPATIENT)
Dept: DERMATOLOGY | Facility: CLINIC | Age: 84
End: 2024-07-12
Payer: MEDICARE

## 2024-07-12 DIAGNOSIS — D22.9 MULTIPLE BENIGN NEVI: ICD-10-CM

## 2024-07-12 DIAGNOSIS — L82.1 SEBORRHEIC KERATOSIS: Primary | ICD-10-CM

## 2024-07-12 PROCEDURE — 1159F MED LIST DOCD IN RCRD: CPT | Performed by: STUDENT IN AN ORGANIZED HEALTH CARE EDUCATION/TRAINING PROGRAM

## 2024-07-12 PROCEDURE — 99213 OFFICE O/P EST LOW 20 MIN: CPT | Performed by: STUDENT IN AN ORGANIZED HEALTH CARE EDUCATION/TRAINING PROGRAM

## 2024-07-12 PROCEDURE — 17110 DESTRUCTION B9 LES UP TO 14: CPT | Performed by: STUDENT IN AN ORGANIZED HEALTH CARE EDUCATION/TRAINING PROGRAM

## 2024-07-12 PROCEDURE — 1157F ADVNC CARE PLAN IN RCRD: CPT | Performed by: STUDENT IN AN ORGANIZED HEALTH CARE EDUCATION/TRAINING PROGRAM

## 2024-07-12 PROCEDURE — 1123F ACP DISCUSS/DSCN MKR DOCD: CPT | Performed by: STUDENT IN AN ORGANIZED HEALTH CARE EDUCATION/TRAINING PROGRAM

## 2024-07-12 ASSESSMENT — DERMATOLOGY PATIENT ASSESSMENT: DO YOU HAVE ANY NEW OR CHANGING LESIONS: YES

## 2024-07-12 ASSESSMENT — DERMATOLOGY QUALITY OF LIFE (QOL) ASSESSMENT
ARE THERE EXCLUSIONS OR EXCEPTIONS FOR THE QUALITY OF LIFE ASSESSMENT: NO
RATE HOW BOTHERED YOU ARE BY SYMPTOMS OF YOUR SKIN PROBLEM (EG, ITCHING, STINGING BURNING, HURTING OR SKIN IRRITATION): 0 - NEVER BOTHERED
RATE HOW EMOTIONALLY BOTHERED YOU ARE BY YOUR SKIN PROBLEM (FOR EXAMPLE, WORRY, EMBARRASSMENT, FRUSTRATION): 0 - NEVER BOTHERED
DATE THE QUALITY-OF-LIFE ASSESSMENT WAS COMPLETED: 67033
RATE HOW BOTHERED YOU ARE BY EFFECTS OF YOUR SKIN PROBLEMS ON YOUR ACTIVITIES (EG, GOING OUT, ACCOMPLISHING WHAT YOU WANT, WORK ACTIVITIES OR YOUR RELATIONSHIPS WITH OTHERS): 0 - NEVER BOTHERED

## 2024-07-12 ASSESSMENT — PATIENT GLOBAL ASSESSMENT (PGA): PATIENT GLOBAL ASSESSMENT: PATIENT GLOBAL ASSESSMENT:  2 - MILD

## 2024-07-12 ASSESSMENT — ITCH NUMERIC RATING SCALE: HOW SEVERE IS YOUR ITCHING?: 3

## 2024-07-12 NOTE — PROGRESS NOTES
"Subjective     Selam Sanabria is a 84 y.o. female who presents for the following: Skin Check (Pt concerned about spots on Forehead, Shoulder, and Back.).     Review of Systems:  No other skin or systemic complaints other than what is documented elsewhere in the note.    The following portions of the chart were reviewed this encounter and updated as appropriate:         Skin Cancer History  No skin cancer on file.      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    A full examination was performed including scalp, head, eyes, ears, nose, lips, neck, chest, axillae, abdomen, back, buttocks, bilateral upper extremities, bilateral lower extremities, hands, feet, fingers, toes, fingernails, and toenails. All findings within normal limits unless otherwise noted below.    Assessment/Plan   1. Seborrheic keratosis  Head - Anterior (Face)  Inflamed seborrheic keratoses: pink and brown \"stuck on\" verrucous scaly papule with surrounding erythema and bloody crust.    Cryotherapy, skin lesion - Head - Anterior (Face)    2. Multiple benign nevi    Exam findings: Benign appearing macules and papules  Plan: monitor for any new or changing nevi. Notify me should this occur.  Over the counter use of sun screen product (30+ SPF with mineral sun screen) recommended            "

## 2024-08-29 DIAGNOSIS — F51.01 PRIMARY INSOMNIA: ICD-10-CM

## 2024-08-30 RX ORDER — MIRTAZAPINE 7.5 MG/1
7.5 TABLET, FILM COATED ORAL NIGHTLY
Qty: 90 TABLET | Refills: 1 | Status: SHIPPED | OUTPATIENT
Start: 2024-08-30 | End: 2024-11-28

## 2024-09-01 DIAGNOSIS — R79.89 LOW TSH LEVEL: ICD-10-CM

## 2024-09-03 RX ORDER — LIOTHYRONINE SODIUM 5 UG/1
5 TABLET ORAL 3 TIMES DAILY
Qty: 270 TABLET | Refills: 1 | Status: SHIPPED | OUTPATIENT
Start: 2024-09-03

## 2024-09-13 DIAGNOSIS — I10 PRIMARY HYPERTENSION: ICD-10-CM

## 2024-09-13 RX ORDER — AMLODIPINE BESYLATE 5 MG/1
5 TABLET ORAL DAILY
Qty: 90 TABLET | Refills: 3 | Status: SHIPPED | OUTPATIENT
Start: 2024-09-13

## 2024-09-26 ENCOUNTER — APPOINTMENT (OUTPATIENT)
Dept: PRIMARY CARE | Facility: CLINIC | Age: 84
End: 2024-09-26
Payer: MEDICARE

## 2024-09-26 VITALS
DIASTOLIC BLOOD PRESSURE: 80 MMHG | TEMPERATURE: 97.5 F | HEART RATE: 68 BPM | WEIGHT: 113 LBS | SYSTOLIC BLOOD PRESSURE: 130 MMHG | OXYGEN SATURATION: 97 % | BODY MASS INDEX: 23.62 KG/M2

## 2024-09-26 DIAGNOSIS — G50.0 TRIGEMINAL NEURALGIA: Primary | ICD-10-CM

## 2024-09-26 DIAGNOSIS — Z23 FLU VACCINE NEED: ICD-10-CM

## 2024-09-26 DIAGNOSIS — Z12.31 BREAST CANCER SCREENING BY MAMMOGRAM: ICD-10-CM

## 2024-09-26 PROCEDURE — 1160F RVW MEDS BY RX/DR IN RCRD: CPT | Performed by: FAMILY MEDICINE

## 2024-09-26 PROCEDURE — G0008 ADMIN INFLUENZA VIRUS VAC: HCPCS | Performed by: FAMILY MEDICINE

## 2024-09-26 PROCEDURE — 1159F MED LIST DOCD IN RCRD: CPT | Performed by: FAMILY MEDICINE

## 2024-09-26 PROCEDURE — 1157F ADVNC CARE PLAN IN RCRD: CPT | Performed by: FAMILY MEDICINE

## 2024-09-26 PROCEDURE — 1123F ACP DISCUSS/DSCN MKR DOCD: CPT | Performed by: FAMILY MEDICINE

## 2024-09-26 PROCEDURE — 99213 OFFICE O/P EST LOW 20 MIN: CPT | Performed by: FAMILY MEDICINE

## 2024-09-26 PROCEDURE — 3075F SYST BP GE 130 - 139MM HG: CPT | Performed by: FAMILY MEDICINE

## 2024-09-26 PROCEDURE — 3079F DIAST BP 80-89 MM HG: CPT | Performed by: FAMILY MEDICINE

## 2024-09-26 PROCEDURE — 64600 INJECTION TREATMENT OF NERVE: CPT | Performed by: FAMILY MEDICINE

## 2024-09-26 PROCEDURE — 1126F AMNT PAIN NOTED NONE PRSNT: CPT | Performed by: FAMILY MEDICINE

## 2024-09-26 PROCEDURE — 90662 IIV NO PRSV INCREASED AG IM: CPT | Performed by: FAMILY MEDICINE

## 2024-09-26 ASSESSMENT — ENCOUNTER SYMPTOMS
HEADACHES: 0
DYSPHORIC MOOD: 1
SHORTNESS OF BREATH: 0
FEVER: 0
SORE THROAT: 0
ABDOMINAL PAIN: 0
CONFUSION: 0
COUGH: 0
HEMATURIA: 0
TROUBLE SWALLOWING: 0
FREQUENCY: 0
JOINT SWELLING: 0
HALLUCINATIONS: 0
PALPITATIONS: 0
UNEXPECTED WEIGHT CHANGE: 0
FATIGUE: 0
DIZZINESS: 0
DECREASED CONCENTRATION: 0
VOMITING: 0
NUMBNESS: 0
WEAKNESS: 1
DYSURIA: 0
DIARRHEA: 0
BLOOD IN STOOL: 0
NAUSEA: 0
EYE PAIN: 0

## 2024-09-26 ASSESSMENT — PAIN SCALES - GENERAL: PAINLEVEL: 0-NO PAIN

## 2024-09-26 NOTE — PATIENT INSTRUCTIONS
Ordered mammogram.  Flu shot given.  Gave her an injection over the supraorbital and supratrochlear nerves of the right brow.  I will send her to pain management to see if there is some sort of ablation or more permanent treatment they can do.

## 2024-09-26 NOTE — PROGRESS NOTES
Subjective   Patient ID: Selam Sanabria is a 84 y.o. female.    Selam comes in today for follow-up.  She recently lost her  of many years.  She has a grown son nearby that is helping.  She would like a flu shot.  She has trigeminal neuralgia after shingles infection and it is over the right brow.  I have been doing injections with Kenalog and lidocaine that helped for a few months.  She is questioning if there is a procedure that can give her longer lasting effect.        Review of Systems   Constitutional:  Negative for fatigue, fever and unexpected weight change.   HENT:  Negative for congestion, ear pain, hearing loss, sore throat and trouble swallowing.    Eyes:  Negative for pain and visual disturbance.   Respiratory:  Negative for cough and shortness of breath.    Cardiovascular:  Negative for chest pain, palpitations and leg swelling.   Gastrointestinal:  Negative for abdominal pain, blood in stool, diarrhea, nausea and vomiting.   Genitourinary:  Negative for dysuria, frequency, hematuria and urgency.   Musculoskeletal:  Negative for joint swelling.   Skin:  Negative for pallor and rash.   Neurological:  Positive for weakness. Negative for dizziness, syncope, numbness and headaches.   Psychiatric/Behavioral:  Positive for dysphoric mood. Negative for confusion, decreased concentration, hallucinations and suicidal ideas.      Vitals:    09/26/24 1015   BP: 130/80   Pulse: 68   Temp: 36.4 °C (97.5 °F)   SpO2: 97%      Objective   Physical Exam  Constitutional:       Appearance: Normal appearance.   HENT:      Head:        Comments: A mixture of 20 mg of Kenalog totaling 1/2 cc, half cc of 2% lidocaine without epinephrine.  Half cc of the solution was injected over the supraorbital nerve distribution and the other half was injected over the supratrochlear nerve distribution as noted above in the picture.  Cardiovascular:      Rate and Rhythm: Normal rate and regular rhythm.      Heart sounds: Normal  heart sounds.   Pulmonary:      Effort: Pulmonary effort is normal.      Breath sounds: Normal breath sounds.   Musculoskeletal:      Cervical back: Neck supple.      Right lower leg: No edema.      Left lower leg: No edema.   Skin:     General: Skin is warm and dry.   Neurological:      Mental Status: She is alert.      Comments: Weakness RLE, hx polio   Psychiatric:         Mood and Affect: Mood normal.         Speech: Speech normal.         Behavior: Behavior normal.         Cognition and Memory: Cognition normal.         Assessment/Plan   Diagnoses and all orders for this visit:  Trigeminal neuralgia  -     Referral to Pain Medicine; Future  Breast cancer screening by mammogram  -     BI mammo bilateral screening tomosynthesis; Future  Flu vaccine need  -     Flu vaccine, trivalent, preservative free, HIGH-DOSE, age 65y+ (Fluzone)

## 2024-10-10 ENCOUNTER — OFFICE VISIT (OUTPATIENT)
Dept: URGENT CARE | Age: 84
End: 2024-10-10
Payer: MEDICARE

## 2024-10-10 VITALS
RESPIRATION RATE: 18 BRPM | TEMPERATURE: 97.3 F | SYSTOLIC BLOOD PRESSURE: 155 MMHG | HEART RATE: 70 BPM | OXYGEN SATURATION: 96 % | DIASTOLIC BLOOD PRESSURE: 75 MMHG

## 2024-10-10 DIAGNOSIS — S50.819A ABRASION FOREARM: Primary | ICD-10-CM

## 2024-10-10 RX ORDER — CLINDAMYCIN HYDROCHLORIDE 300 MG/1
300 CAPSULE ORAL 2 TIMES DAILY
Qty: 14 CAPSULE | Refills: 0 | Status: SHIPPED | OUTPATIENT
Start: 2024-10-10 | End: 2024-10-17

## 2024-10-10 ASSESSMENT — ENCOUNTER SYMPTOMS: WOUND: 1

## 2024-10-10 NOTE — PATIENT INSTRUCTIONS
You were seen at Urgent Care today for an abrasion to your forearm.  Please treat as discussed. Please take medications as prescribed. Monitor for red flags which we spoke about, If your symptoms change, worsen or become concerning in any way, please go to the emergency room immediately, otherwise you can followup with wound care as planned.  A referral has been placed on your behalf.

## 2024-10-10 NOTE — PROGRESS NOTES
Subjective   Patient ID: Selam Sanabria is a 84 y.o. female. They present today with a chief complaint of Injury (FIST SIZE OPEN WOUND ON ARM FROM FALLING ).    History of Present Illness  Patient is an 84-year-old female who presents to urgent care [facility] with her son for complaint of left arm pain.  She states she was sitting in her chair when she went to stand up and her arm slid off, scraping against the wooden side.  She denies any falls or any other injuries.  She is not on blood thinners.  No other complaints or concerns mention at this time.      History provided by:  Patient  Injury      Past Medical History  Allergies as of 10/10/2024 - Reviewed 10/10/2024   Allergen Reaction Noted    Cephalexin Unknown 11/29/2022       (Not in a hospital admission)         Past Medical History:   Diagnosis Date    Abnormal mammogram 02/15/2023    COVID-19 virus infection 05/08/2023    Knee pain 02/15/2023    Shoulder injury 02/15/2023    Shoulder pain 02/15/2023    Skin lesion 02/15/2023       No past surgical history on file.     reports that she has never smoked. She has never used smokeless tobacco.    Review of Systems  Review of Systems   Skin:  Positive for wound.                                  Objective    Vitals:    10/10/24 1832   BP: 155/75   Pulse: 70   Resp: 18   Temp: 36.3 °C (97.3 °F)   SpO2: 96%     No LMP recorded (lmp unknown). Patient is postmenopausal.    Physical Exam  Vitals and nursing note reviewed.   Constitutional:       General: She is not in acute distress.     Appearance: Normal appearance. She is not ill-appearing, toxic-appearing or diaphoretic.   HENT:      Head: Normocephalic and atraumatic.      Mouth/Throat:      Mouth: Mucous membranes are moist.   Eyes:      Extraocular Movements: Extraocular movements intact.      Conjunctiva/sclera: Conjunctivae normal.      Pupils: Pupils are equal, round, and reactive to light.   Cardiovascular:      Rate and Rhythm: Normal rate and regular  rhythm.      Pulses: Normal pulses.      Heart sounds: Normal heart sounds.   Pulmonary:      Effort: Pulmonary effort is normal. No respiratory distress.      Breath sounds: Normal breath sounds. No stridor. No wheezing, rhonchi or rales.   Chest:      Chest wall: No tenderness.   Musculoskeletal:         General: Normal range of motion.      Cervical back: Normal range of motion and neck supple.   Skin:     General: Skin is warm and dry.      Capillary Refill: Capillary refill takes less than 2 seconds.      Findings: Abrasion, erythema, signs of injury, lesion and wound present. No abscess, bruising, burn, ecchymosis, laceration, petechiae or rash.             Comments: Superficial skin abrasion on the left forearm as depicted above.  Approximately 3 cm in length.  Superficial capillary drainage.  No active bleeding.  Otherwise normal arm exam.   Neurological:      General: No focal deficit present.      Mental Status: She is alert and oriented to person, place, and time.   Psychiatric:         Mood and Affect: Mood normal.         Behavior: Behavior normal.         Laceration Repair    Date/Time: 10/10/2024 7:21 PM    Performed by: BRIANDA Saucedo  Authorized by: BRIANDA Saucedo    Consent:     Consent obtained:  Verbal    Consent given by:  Patient    Risks discussed:  Infection, need for additional repair, pain, retained foreign body, tendon damage, poor cosmetic result, nerve damage, poor wound healing and vascular damage    Alternatives discussed:  No treatment, delayed treatment, observation and referral  Universal protocol:     Procedure explained and questions answered to patient or proxy's satisfaction: yes      Relevant documents present and verified: yes      Site/side marked: yes      Immediately prior to procedure, a time out was called: yes      Patient identity confirmed:  Verbally with patient  Anesthesia:     Anesthesia method:  None  Laceration details:     Location:   Shoulder/arm    Shoulder/arm location:  L lower arm    Length (cm):  3  Exploration:     Limited defect created (wound extended): no      Hemostasis achieved with:  Direct pressure    Imaging outcome: foreign body not noted      Wound exploration: wound explored through full range of motion and entire depth of wound visualized      Contaminated: no    Treatment:     Area cleansed with:  Chlorhexidine    Amount of cleaning:  Standard    Irrigation solution:  Sterile saline    Irrigation method:  Pressure wash    Debridement:  None    Undermining:  None    Scar revision: no    Repair type:     Repair type:  Simple  Post-procedure details:     Dressing:  Antibiotic ointment, non-adherent dressing and bulky dressing    Procedure completion:  Tolerated well, no immediate complications        Assessment/Plan   Allergies, medications, history, and pertinent labs/EKGs/Imaging reviewed by BRIANDA Saucedo.     Medical Decision Making  Patient is well appearing, afebrile, non toxic, not hypoxic, and appropriate for outpatient treatment and management at time of evaluation. Patient presents with abrasion to left forearm as described above. Differential includes but not limited to: Abrasion, contusion, laceration, other.  Exam consistent with superficial skin tear/abrasion.  Sutures are not appropriate given there is no deep laceration.  No signs of infection or obvious foreign bodies.  Bleeding was controlled with direct pressure.  Wound was cleansed per protocol.  Antibiotic ointment applied.  Wound was wrapped in sterile dressing.  See procedure note.  Patient's tetanus was updated.  She was also provided with a prescription for clindamycin as she notes a reaction to penicillin and Keflex.  A wound referral was placed on her behalf.  She will follow-up with them at the next available appointment.  She was discharged in stable condition.  All questions and concerns addressed.    Plan: Discussed differential with the  patient. Patient voices understanding and is agreeable to close follow-up with their PCP in the next 2-3 days. They understand they should go to the emergency room immediately for any new, worsening or concerning symptoms. Patient understands return precautions and discharge instructions.      Orders and Diagnoses  There are no diagnoses linked to this encounter.    Medical Admin Record      Follow Up Instructions  No follow-ups on file.    Patient disposition: Home    Electronically signed by BRIANDA Saucedo  7:04 PM

## 2024-10-11 ENCOUNTER — OFFICE VISIT (OUTPATIENT)
Dept: WOUND CARE | Facility: CLINIC | Age: 84
End: 2024-10-11
Payer: MEDICARE

## 2024-10-11 PROCEDURE — 99214 OFFICE O/P EST MOD 30 MIN: CPT

## 2024-10-16 ENCOUNTER — CLINICAL SUPPORT (OUTPATIENT)
Dept: WOUND CARE | Facility: CLINIC | Age: 84
End: 2024-10-16
Payer: MEDICARE

## 2024-10-16 PROCEDURE — 99213 OFFICE O/P EST LOW 20 MIN: CPT

## 2024-10-18 ENCOUNTER — OFFICE VISIT (OUTPATIENT)
Dept: WOUND CARE | Facility: CLINIC | Age: 84
End: 2024-10-18
Payer: MEDICARE

## 2024-10-18 PROCEDURE — 11042 DBRDMT SUBQ TIS 1ST 20SQCM/<: CPT

## 2024-10-21 ENCOUNTER — CLINICAL SUPPORT (OUTPATIENT)
Dept: WOUND CARE | Facility: CLINIC | Age: 84
End: 2024-10-21
Payer: MEDICARE

## 2024-10-21 ENCOUNTER — TELEPHONE (OUTPATIENT)
Dept: PRIMARY CARE | Facility: CLINIC | Age: 84
End: 2024-10-21
Payer: COMMERCIAL

## 2024-10-21 PROCEDURE — 99213 OFFICE O/P EST LOW 20 MIN: CPT

## 2024-10-21 NOTE — TELEPHONE ENCOUNTER
Given cleocin for wound on leg by ER. Having diarrhea persistently. Scheduled appt for Wednesday afternoon. Advised patient to continue antibiotic, along with brat diet until we hear from Dr. Jeffries.     Do you want patient to discontinue antibiotic? First available appt is Wednesday, pt willing to come Tuesday if needed.

## 2024-10-22 DIAGNOSIS — K52.1 DIARRHEA DUE TO DRUG: Primary | ICD-10-CM

## 2024-10-23 ENCOUNTER — APPOINTMENT (OUTPATIENT)
Dept: PRIMARY CARE | Facility: CLINIC | Age: 84
End: 2024-10-23
Payer: COMMERCIAL

## 2024-10-24 ENCOUNTER — LAB (OUTPATIENT)
Dept: LAB | Facility: LAB | Age: 84
End: 2024-10-24
Payer: COMMERCIAL

## 2024-10-24 DIAGNOSIS — K52.1 DIARRHEA DUE TO DRUG: ICD-10-CM

## 2024-10-25 ENCOUNTER — OFFICE VISIT (OUTPATIENT)
Dept: WOUND CARE | Facility: CLINIC | Age: 84
End: 2024-10-25
Payer: MEDICARE

## 2024-10-25 PROCEDURE — 99213 OFFICE O/P EST LOW 20 MIN: CPT

## 2024-11-01 ENCOUNTER — OFFICE VISIT (OUTPATIENT)
Dept: WOUND CARE | Facility: CLINIC | Age: 84
End: 2024-11-01
Payer: MEDICARE

## 2024-11-01 PROCEDURE — 99212 OFFICE O/P EST SF 10 MIN: CPT

## 2024-11-12 DIAGNOSIS — M81.0 AGE-RELATED OSTEOPOROSIS WITHOUT CURRENT PATHOLOGICAL FRACTURE: ICD-10-CM

## 2024-11-12 DIAGNOSIS — M62.81 MUSCLE WEAKNESS (GENERALIZED): ICD-10-CM

## 2024-11-12 DIAGNOSIS — S92.314D NONDISPLACED FRACTURE OF FIRST METATARSAL BONE, RIGHT FOOT, SUBSEQUENT ENCOUNTER FOR FRACTURE WITH ROUTINE HEALING: ICD-10-CM

## 2024-11-13 RX ORDER — GABAPENTIN 300 MG/1
300 CAPSULE ORAL 4 TIMES DAILY
Qty: 360 CAPSULE | Refills: 1 | Status: SHIPPED | OUTPATIENT
Start: 2024-11-13

## 2024-11-13 RX ORDER — RALOXIFENE HYDROCHLORIDE 60 MG/1
60 TABLET, FILM COATED ORAL DAILY
Qty: 90 TABLET | Refills: 3 | Status: SHIPPED | OUTPATIENT
Start: 2024-11-13

## 2024-12-30 ENCOUNTER — APPOINTMENT (OUTPATIENT)
Dept: RADIOLOGY | Facility: CLINIC | Age: 84
End: 2024-12-30
Payer: MEDICARE

## 2025-01-31 ENCOUNTER — APPOINTMENT (OUTPATIENT)
Dept: PRIMARY CARE | Facility: CLINIC | Age: 85
End: 2025-01-31
Payer: MEDICARE

## 2025-01-31 VITALS
BODY MASS INDEX: 24.66 KG/M2 | OXYGEN SATURATION: 94 % | TEMPERATURE: 97.3 F | WEIGHT: 118 LBS | HEART RATE: 63 BPM | SYSTOLIC BLOOD PRESSURE: 140 MMHG | DIASTOLIC BLOOD PRESSURE: 80 MMHG

## 2025-01-31 DIAGNOSIS — R79.89 LOW TSH LEVEL: ICD-10-CM

## 2025-01-31 DIAGNOSIS — E03.9 ACQUIRED HYPOTHYROIDISM: ICD-10-CM

## 2025-01-31 DIAGNOSIS — E03.9 ADULT HYPOTHYROIDISM: ICD-10-CM

## 2025-01-31 DIAGNOSIS — E55.9 VITAMIN D DEFICIENCY: ICD-10-CM

## 2025-01-31 DIAGNOSIS — E78.5 DYSLIPIDEMIA: ICD-10-CM

## 2025-01-31 DIAGNOSIS — I10 PRIMARY HYPERTENSION: ICD-10-CM

## 2025-01-31 DIAGNOSIS — Z00.00 ROUTINE GENERAL MEDICAL EXAMINATION AT HEALTH CARE FACILITY: ICD-10-CM

## 2025-01-31 DIAGNOSIS — Z00.00 ROUTINE GENERAL MEDICAL EXAMINATION AT A HEALTH CARE FACILITY: Primary | ICD-10-CM

## 2025-01-31 PROCEDURE — 1170F FXNL STATUS ASSESSED: CPT | Performed by: FAMILY MEDICINE

## 2025-01-31 PROCEDURE — 1123F ACP DISCUSS/DSCN MKR DOCD: CPT | Performed by: FAMILY MEDICINE

## 2025-01-31 PROCEDURE — 1159F MED LIST DOCD IN RCRD: CPT | Performed by: FAMILY MEDICINE

## 2025-01-31 PROCEDURE — G0439 PPPS, SUBSEQ VISIT: HCPCS | Performed by: FAMILY MEDICINE

## 2025-01-31 PROCEDURE — 1126F AMNT PAIN NOTED NONE PRSNT: CPT | Performed by: FAMILY MEDICINE

## 2025-01-31 PROCEDURE — 1157F ADVNC CARE PLAN IN RCRD: CPT | Performed by: FAMILY MEDICINE

## 2025-01-31 PROCEDURE — 3079F DIAST BP 80-89 MM HG: CPT | Performed by: FAMILY MEDICINE

## 2025-01-31 PROCEDURE — 3077F SYST BP >= 140 MM HG: CPT | Performed by: FAMILY MEDICINE

## 2025-01-31 PROCEDURE — 1160F RVW MEDS BY RX/DR IN RCRD: CPT | Performed by: FAMILY MEDICINE

## 2025-01-31 RX ORDER — METOPROLOL SUCCINATE 25 MG/1
25 TABLET, EXTENDED RELEASE ORAL
Qty: 90 TABLET | Refills: 3 | Status: SHIPPED | OUTPATIENT
Start: 2025-01-31 | End: 2026-01-31

## 2025-01-31 RX ORDER — METOPROLOL SUCCINATE 25 MG/1
1 TABLET, EXTENDED RELEASE ORAL
COMMUNITY
Start: 2024-11-30 | End: 2025-01-31 | Stop reason: SDUPTHER

## 2025-01-31 RX ORDER — LIOTHYRONINE SODIUM 5 UG/1
5 TABLET ORAL 3 TIMES DAILY
Qty: 270 TABLET | Refills: 1 | Status: SHIPPED | OUTPATIENT
Start: 2025-01-31

## 2025-01-31 RX ORDER — LEVOTHYROXINE SODIUM 50 UG/1
50 TABLET ORAL
Qty: 90 TABLET | Refills: 3 | Status: SHIPPED | OUTPATIENT
Start: 2025-01-31

## 2025-01-31 ASSESSMENT — ENCOUNTER SYMPTOMS
HEMATURIA: 0
DYSURIA: 0
SORE THROAT: 0
NAUSEA: 0
HALLUCINATIONS: 0
VOMITING: 0
BLOOD IN STOOL: 0
FATIGUE: 0
ABDOMINAL PAIN: 0
EYE PAIN: 0
CONFUSION: 0
DIZZINESS: 0
JOINT SWELLING: 0
SHORTNESS OF BREATH: 0
FREQUENCY: 0
WEAKNESS: 1
PALPITATIONS: 0
DECREASED CONCENTRATION: 0
HEADACHES: 0
COUGH: 0
FEVER: 0
DIARRHEA: 0
TROUBLE SWALLOWING: 0
NUMBNESS: 0
UNEXPECTED WEIGHT CHANGE: 0

## 2025-01-31 ASSESSMENT — PATIENT HEALTH QUESTIONNAIRE - PHQ9
2. FEELING DOWN, DEPRESSED OR HOPELESS: NOT AT ALL
1. LITTLE INTEREST OR PLEASURE IN DOING THINGS: NOT AT ALL
SUM OF ALL RESPONSES TO PHQ9 QUESTIONS 1 AND 2: 0

## 2025-01-31 ASSESSMENT — ACTIVITIES OF DAILY LIVING (ADL)
DRESSING: INDEPENDENT
GROCERY_SHOPPING: INDEPENDENT
BATHING: INDEPENDENT
MANAGING_FINANCES: INDEPENDENT
DOING_HOUSEWORK: INDEPENDENT
TAKING_MEDICATION: INDEPENDENT

## 2025-01-31 ASSESSMENT — PAIN SCALES - GENERAL: PAINLEVEL_OUTOF10: 0-NO PAIN

## 2025-01-31 NOTE — PATIENT INSTRUCTIONS
It was nice to see you today!  Discussed current concerns and addressed   Reviewed recent labs and diagnostics  Reviewed medications list  Continue to eat a healthy diet, exercise at least 3 times a week or more  Plan and follow up discussed  For any further information related to your condition, copy and paste or go to [email or web address]  Check labs in April

## 2025-01-31 NOTE — PROGRESS NOTES
Subjective   Reason for Visit: Selam Sanabria is an 84 y.o. female here for a Medicare Wellness visit.               Selam Sanabria comes in today for medicare wellness.  There has been no chest pain, shortness of breath, fever, chills, unexplained weight loss, rectal bleeding or any other unusual symptoms. Chronic RLE problems due to fractures. Uses walker. Lost  past year. Is doing ok. Lives alone. Driving. Hx trigeminal neuralgia that has finally subsided. Was getting injections.  Body mass index is 24.66 kg/m². Most recent labs, diagnostics and pertinent information has been reviewed. Will update if necessary.  Patient is attempting to eat a healthy diet and incorporate exercise in to their lifestyle. Patient does not smoke. Patient is practicing routine eye and dental care. Reviewed employment status. Reviewed current medications, if any. Immunizations reviewed and updated if appropriate.          Patient Care Team:  Edilia Jeffries MD as PCP - General  Edilia Jeffries MD as PCP - [facility] Attributed Provider     Review of Systems   Constitutional:  Negative for fatigue, fever and unexpected weight change.   HENT:  Negative for congestion, ear pain, hearing loss, sore throat and trouble swallowing.    Eyes:  Negative for pain and visual disturbance.   Respiratory:  Negative for cough and shortness of breath.    Cardiovascular:  Negative for chest pain, palpitations and leg swelling.   Gastrointestinal:  Negative for abdominal pain, blood in stool, diarrhea, nausea and vomiting.   Genitourinary:  Negative for dysuria, frequency, hematuria and urgency.   Musculoskeletal:  Positive for gait problem. Negative for joint swelling.   Skin:  Negative for pallor and rash.   Neurological:  Positive for weakness. Negative for dizziness, syncope, numbness and headaches.   Psychiatric/Behavioral:  Negative for confusion, decreased concentration, hallucinations and suicidal ideas.         Grief  reaction.       Objective   Vitals:  /80   Pulse 63   Temp 36.3 °C (97.3 °F)   Wt 53.5 kg (118 lb)   LMP  (LMP Unknown)   SpO2 94%   BMI 24.66 kg/m²       Physical Exam  Constitutional:       General: She is not in acute distress.     Appearance: Normal appearance. She is not toxic-appearing.   HENT:      Head: Normocephalic and atraumatic.   Eyes:      Extraocular Movements: Extraocular movements intact.      Conjunctiva/sclera: Conjunctivae normal.      Pupils: Pupils are equal, round, and reactive to light.   Cardiovascular:      Rate and Rhythm: Normal rate and regular rhythm.      Heart sounds: Normal heart sounds.   Pulmonary:      Effort: Pulmonary effort is normal.      Breath sounds: Normal breath sounds.   Musculoskeletal:      Cervical back: Neck supple.      Right lower leg: No edema.      Left lower leg: No edema.   Skin:     General: Skin is warm and dry.   Neurological:      Mental Status: She is alert and oriented to person, place, and time. Mental status is at baseline.   Psychiatric:         Mood and Affect: Mood normal.         Speech: Speech normal.         Behavior: Behavior normal.         Thought Content: Thought content normal.         Cognition and Memory: Cognition normal.         Judgment: Judgment normal.         Assessment & Plan  Acquired hypothyroidism    Orders:    CBC and Auto Differential; Future    Thyroid Stimulating Hormone; Future    Vitamin D deficiency    Orders:    Vitamin D 25-Hydroxy,Total (for eval of Vitamin D levels); Future    Dyslipidemia    Orders:    Lipid Panel; Future    Primary hypertension    Orders:    CBC and Auto Differential; Future    Comprehensive Metabolic Panel; Future    metoprolol succinate XL (Toprol-XL) 25 mg 24 hr tablet; Take 1 tablet (25 mg) by mouth early in the morning..    Adult hypothyroidism    Orders:    levothyroxine (Synthroid, Levoxyl) 50 mcg tablet; Take 1 tablet (50 mcg) by mouth once daily in the morning. Take before  meals.    Low TSH level    Orders:    liothyronine (Cytomel) 5 mcg tablet; Take 1 tablet (5 mcg) by mouth 3 times a day.

## 2025-01-31 NOTE — ASSESSMENT & PLAN NOTE
Orders:    CBC and Auto Differential; Future    Comprehensive Metabolic Panel; Future    metoprolol succinate XL (Toprol-XL) 25 mg 24 hr tablet; Take 1 tablet (25 mg) by mouth early in the morning..

## 2025-02-03 DIAGNOSIS — F41.1 GENERALIZED ANXIETY DISORDER: ICD-10-CM

## 2025-02-03 RX ORDER — CITALOPRAM 20 MG/1
20 TABLET, FILM COATED ORAL DAILY
Qty: 90 TABLET | Refills: 3 | Status: SHIPPED | OUTPATIENT
Start: 2025-02-03

## 2025-03-13 ENCOUNTER — OFFICE VISIT (OUTPATIENT)
Dept: PRIMARY CARE | Facility: CLINIC | Age: 85
End: 2025-03-13
Payer: MEDICARE

## 2025-03-13 VITALS
BODY MASS INDEX: 24.98 KG/M2 | OXYGEN SATURATION: 99 % | DIASTOLIC BLOOD PRESSURE: 82 MMHG | WEIGHT: 119 LBS | HEART RATE: 67 BPM | TEMPERATURE: 97.5 F | SYSTOLIC BLOOD PRESSURE: 132 MMHG | HEIGHT: 58 IN

## 2025-03-13 DIAGNOSIS — B02.22 POST-HERPETIC TRIGEMINAL NEURALGIA: Primary | ICD-10-CM

## 2025-03-14 RX ORDER — TRIAMCINOLONE ACETONIDE 40 MG/ML
5 INJECTION, SUSPENSION INTRA-ARTICULAR; INTRAMUSCULAR ONCE
Status: COMPLETED | OUTPATIENT
Start: 2025-03-14 | End: 2025-03-14

## 2025-03-14 RX ADMIN — TRIAMCINOLONE ACETONIDE 5 MG: 40 INJECTION, SUSPENSION INTRA-ARTICULAR; INTRAMUSCULAR at 12:38

## 2025-03-14 ASSESSMENT — ENCOUNTER SYMPTOMS
ARTHRALGIAS: 1
HEADACHES: 0
SHORTNESS OF BREATH: 0
FEVER: 0
DYSURIA: 0
DIARRHEA: 0
ABDOMINAL PAIN: 0
HALLUCINATIONS: 0
VOMITING: 0
COUGH: 0
EYE PAIN: 0
HEMATURIA: 0
NUMBNESS: 0
UNEXPECTED WEIGHT CHANGE: 0
NAUSEA: 0
PALPITATIONS: 0
WEAKNESS: 0
TROUBLE SWALLOWING: 0
BLOOD IN STOOL: 0
CONFUSION: 0
FREQUENCY: 0
JOINT SWELLING: 0
SORE THROAT: 0
FATIGUE: 0
DIZZINESS: 0
DECREASED CONCENTRATION: 0

## 2025-03-14 NOTE — PATIENT INSTRUCTIONS
It was nice to see you today!  Discussed current concerns and addressed   Reviewed recent labs and diagnostics  Reviewed medications list  Continue to eat a healthy diet, exercise at least 3 times a week or more  Plan and follow up discussed  For any further information related to your condition, copy and paste or go to [email or web address]  Injections done.  Patient would rather me do them then pain management.  Apparently she is not a candidate for any other procedures.

## 2025-03-14 NOTE — PROGRESS NOTES
Subjective   Patient ID: Selam Sanabria is a 85 y.o. female.    Selam comes in today to have her supraorbital and supratrochlear nerve injected.  I have done this many times for her over the last few years.  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  History of postpolio syndrome, and fractures.  She recently lost her .  She has a son nearby.  She is still grieving but she is getting along.        Review of Systems   Constitutional:  Negative for fatigue, fever and unexpected weight change.   HENT:  Negative for congestion, ear pain, hearing loss, sore throat and trouble swallowing.         Facial pain   Eyes:  Negative for pain and visual disturbance.   Respiratory:  Negative for cough and shortness of breath.    Cardiovascular:  Negative for chest pain, palpitations and leg swelling.   Gastrointestinal:  Negative for abdominal pain, blood in stool, diarrhea, nausea and vomiting.   Genitourinary:  Negative for dysuria, frequency, hematuria and urgency.   Musculoskeletal:  Positive for arthralgias and gait problem. Negative for joint swelling.   Skin:  Negative for pallor and rash.   Neurological:  Negative for dizziness, syncope, weakness, numbness and headaches.   Psychiatric/Behavioral:  Negative for confusion, decreased concentration, hallucinations and suicidal ideas.      hysical Exam  Constitutional:       Appearance: Normal appearance.   HENT:      Head:        Comments: A mixture of 20 mg of Kenalog totaling 1/2 cc, half cc of 2% lidocaine without epinephrine.  Half cc of the solution was injected over the supraorbital nerve distribution and the other half was injected over the supratrochlear nerve distribution as noted above in the picture.  Vitals:    03/13/25 1241   BP: 132/82   Pulse: 67   Temp: 36.4 °C (97.5 °F)   SpO2: 99%      Body mass index is 24.87 kg/m².      Last Labs:     CMP:   Lab Results   Component Value Date    CALCIUM 9.7 04/22/2024     "CALCIUM 9.7 05/02/2023    CALCIUM 9.2 02/13/2023    PHOS 3.0 12/19/2019    PHOS 2.9 12/17/2019    PROT 6.4 04/22/2024    PROT 6.7 05/02/2023    PROT 6.9 01/28/2021    ALBUMIN 4.0 04/22/2024    ALBUMIN 4.6 05/02/2023    ALBUMIN 4.4 01/28/2021    AST 18 04/22/2024    AST 26 05/02/2023    AST 22 01/28/2021    ALKPHOS 56 04/22/2024    ALKPHOS 47 05/02/2023    ALKPHOS 70 01/28/2021    BILITOT 0.6 04/22/2024    BILITOT 0.8 05/02/2023    BILITOT 0.8 01/28/2021     CBC:   Lab Results   Component Value Date    WBC 4.9 04/22/2024    WBC 5.5 05/02/2023    WBC 5.1 01/28/2021    HGB 12.4 04/22/2024    HGB 13.3 05/02/2023    HGB 13.1 01/28/2021    HCT 38.0 04/22/2024    HCT 41.4 05/02/2023    HCT 39.4 01/28/2021    MCV 93 04/22/2024    MCV 94 05/02/2023    MCV 98 01/28/2021     04/22/2024     05/02/2023     01/28/2021     A1C:   No results found for: \"HGBA1C\"  LIPID PANEL:   Lab Results   Component Value Date    CHOL 182 04/22/2024    CHOL 207 (H) 05/02/2023    CHOL 198 01/28/2021    TRIG 56 04/22/2024    TRIG 45 05/02/2023    TRIG 74 01/28/2021    HDL 95.8 04/22/2024    .5 05/02/2023    HDL 96.4 01/28/2021    CHHDL 1.9 04/22/2024    CHHDL 2.0 05/02/2023    CHHDL 2.1 01/28/2021    LDLF 97 05/02/2023    LDLF 87 01/28/2021    VLDL 11 04/22/2024    VLDL 9 05/02/2023    VLDL 15 01/28/2021    NHDL 86 04/22/2024     TSH:   Lab Results   Component Value Date    TSH 0.75 04/22/2024    TSH 1.31 05/02/2023    TSH 1.47 04/27/2021     PSA:   No results found for: \"PSA\"     Assessment/Plan   There are no diagnoses linked to this encounter.      "

## 2025-04-21 ENCOUNTER — TELEPHONE (OUTPATIENT)
Dept: PRIMARY CARE | Facility: CLINIC | Age: 85
End: 2025-04-21
Payer: COMMERCIAL

## 2025-04-21 NOTE — TELEPHONE ENCOUNTER
Calling for gabapentin  refill. Chart shows discontinued on 1/31/2025-note states trigeminal neuralgia has subsided. Patient states trigeminal neuralgia is still occurring. Taking 300 mg, 3 times daily

## 2025-04-22 DIAGNOSIS — B02.22 POST-HERPETIC TRIGEMINAL NEURALGIA: Primary | ICD-10-CM

## 2025-04-22 RX ORDER — GABAPENTIN 300 MG/1
300 CAPSULE ORAL 3 TIMES DAILY
Qty: 270 CAPSULE | Refills: 1 | Status: SHIPPED | OUTPATIENT
Start: 2025-04-22 | End: 2025-10-19

## 2025-05-07 LAB
25(OH)D3+25(OH)D2 SERPL-MCNC: 58 NG/ML (ref 30–100)
ALBUMIN SERPL-MCNC: 4.6 G/DL (ref 3.6–5.1)
ALP SERPL-CCNC: 50 U/L (ref 37–153)
ALT SERPL-CCNC: 13 U/L (ref 6–29)
ANION GAP SERPL CALCULATED.4IONS-SCNC: 10 MMOL/L (CALC) (ref 7–17)
AST SERPL-CCNC: 22 U/L (ref 10–35)
BASOPHILS # BLD AUTO: 32 CELLS/UL (ref 0–200)
BASOPHILS NFR BLD AUTO: 0.6 %
BILIRUB SERPL-MCNC: 0.7 MG/DL (ref 0.2–1.2)
BUN SERPL-MCNC: 25 MG/DL (ref 7–25)
CALCIUM SERPL-MCNC: 10.3 MG/DL (ref 8.6–10.4)
CHLORIDE SERPL-SCNC: 97 MMOL/L (ref 98–110)
CHOLEST SERPL-MCNC: 234 MG/DL
CHOLEST/HDLC SERPL: 1.9 (CALC)
CO2 SERPL-SCNC: 27 MMOL/L (ref 20–32)
CREAT SERPL-MCNC: 0.82 MG/DL (ref 0.6–0.95)
EGFRCR SERPLBLD CKD-EPI 2021: 70 ML/MIN/1.73M2
EOSINOPHIL # BLD AUTO: 154 CELLS/UL (ref 15–500)
EOSINOPHIL NFR BLD AUTO: 2.9 %
ERYTHROCYTE [DISTWIDTH] IN BLOOD BY AUTOMATED COUNT: 12.7 % (ref 11–15)
GLUCOSE SERPL-MCNC: 72 MG/DL (ref 65–99)
HCT VFR BLD AUTO: 41.1 % (ref 35–45)
HDLC SERPL-MCNC: 124 MG/DL
HGB BLD-MCNC: 13.7 G/DL (ref 11.7–15.5)
LDLC SERPL CALC-MCNC: 99 MG/DL (CALC)
LYMPHOCYTES # BLD AUTO: 949 CELLS/UL (ref 850–3900)
LYMPHOCYTES NFR BLD AUTO: 17.9 %
MCH RBC QN AUTO: 30.6 PG (ref 27–33)
MCHC RBC AUTO-ENTMCNC: 33.3 G/DL (ref 32–36)
MCV RBC AUTO: 91.9 FL (ref 80–100)
MONOCYTES # BLD AUTO: 514 CELLS/UL (ref 200–950)
MONOCYTES NFR BLD AUTO: 9.7 %
NEUTROPHILS # BLD AUTO: 3652 CELLS/UL (ref 1500–7800)
NEUTROPHILS NFR BLD AUTO: 68.9 %
NONHDLC SERPL-MCNC: 110 MG/DL (CALC)
PLATELET # BLD AUTO: 281 THOUSAND/UL (ref 140–400)
PMV BLD REES-ECKER: 10 FL (ref 7.5–12.5)
POTASSIUM SERPL-SCNC: 4.3 MMOL/L (ref 3.5–5.3)
PROT SERPL-MCNC: 7.5 G/DL (ref 6.1–8.1)
RBC # BLD AUTO: 4.47 MILLION/UL (ref 3.8–5.1)
SODIUM SERPL-SCNC: 134 MMOL/L (ref 135–146)
TRIGL SERPL-MCNC: 36 MG/DL
TSH SERPL-ACNC: 1.4 MIU/L (ref 0.4–4.5)
WBC # BLD AUTO: 5.3 THOUSAND/UL (ref 3.8–10.8)

## 2025-07-06 ENCOUNTER — APPOINTMENT (OUTPATIENT)
Dept: CARDIOLOGY | Facility: HOSPITAL | Age: 85
End: 2025-07-06
Payer: MEDICARE

## 2025-07-06 ENCOUNTER — HOSPITAL ENCOUNTER (EMERGENCY)
Facility: HOSPITAL | Age: 85
Discharge: HOME | End: 2025-07-06
Attending: EMERGENCY MEDICINE
Payer: MEDICARE

## 2025-07-06 VITALS
OXYGEN SATURATION: 98 % | HEIGHT: 58 IN | DIASTOLIC BLOOD PRESSURE: 75 MMHG | BODY MASS INDEX: 24.98 KG/M2 | RESPIRATION RATE: 18 BRPM | SYSTOLIC BLOOD PRESSURE: 152 MMHG | WEIGHT: 119 LBS | TEMPERATURE: 98.1 F | HEART RATE: 6 BPM

## 2025-07-06 DIAGNOSIS — R55 VASOVAGAL SYNCOPE: Primary | ICD-10-CM

## 2025-07-06 LAB
ALBUMIN SERPL BCP-MCNC: 4.1 G/DL (ref 3.4–5)
ALP SERPL-CCNC: 52 U/L (ref 33–136)
ALT SERPL W P-5'-P-CCNC: 9 U/L (ref 7–45)
ANION GAP SERPL CALC-SCNC: 15 MMOL/L (ref 10–20)
APPEARANCE UR: CLEAR
AST SERPL W P-5'-P-CCNC: 20 U/L (ref 9–39)
BASOPHILS # BLD AUTO: 0.04 X10*3/UL (ref 0–0.1)
BASOPHILS NFR BLD AUTO: 0.6 %
BILIRUB SERPL-MCNC: 0.6 MG/DL (ref 0–1.2)
BILIRUB UR STRIP.AUTO-MCNC: NEGATIVE MG/DL
BUN SERPL-MCNC: 20 MG/DL (ref 6–23)
CALCIUM SERPL-MCNC: 9.6 MG/DL (ref 8.6–10.3)
CARDIAC TROPONIN I PNL SERPL HS: 5 NG/L (ref 0–13)
CARDIAC TROPONIN I PNL SERPL HS: 5 NG/L (ref 0–13)
CHLORIDE SERPL-SCNC: 100 MMOL/L (ref 98–107)
CO2 SERPL-SCNC: 22 MMOL/L (ref 21–32)
COLOR UR: COLORLESS
CREAT SERPL-MCNC: 0.81 MG/DL (ref 0.5–1.05)
EGFRCR SERPLBLD CKD-EPI 2021: 71 ML/MIN/1.73M*2
EOSINOPHIL # BLD AUTO: 0.22 X10*3/UL (ref 0–0.4)
EOSINOPHIL NFR BLD AUTO: 3.4 %
ERYTHROCYTE [DISTWIDTH] IN BLOOD BY AUTOMATED COUNT: 13.6 % (ref 11.5–14.5)
GLUCOSE SERPL-MCNC: 93 MG/DL (ref 74–99)
GLUCOSE UR STRIP.AUTO-MCNC: NORMAL MG/DL
HCT VFR BLD AUTO: 37.8 % (ref 36–46)
HGB BLD-MCNC: 12.4 G/DL (ref 12–16)
IMM GRANULOCYTES # BLD AUTO: 0.02 X10*3/UL (ref 0–0.5)
IMM GRANULOCYTES NFR BLD AUTO: 0.3 % (ref 0–0.9)
KETONES UR STRIP.AUTO-MCNC: NEGATIVE MG/DL
LACTATE SERPL-SCNC: 0.9 MMOL/L (ref 0.4–2)
LEUKOCYTE ESTERASE UR QL STRIP.AUTO: NEGATIVE
LYMPHOCYTES # BLD AUTO: 1.36 X10*3/UL (ref 0.8–3)
LYMPHOCYTES NFR BLD AUTO: 21 %
MAGNESIUM SERPL-MCNC: 2.26 MG/DL (ref 1.6–2.4)
MCH RBC QN AUTO: 30.5 PG (ref 26–34)
MCHC RBC AUTO-ENTMCNC: 32.8 G/DL (ref 32–36)
MCV RBC AUTO: 93 FL (ref 80–100)
MONOCYTES # BLD AUTO: 0.71 X10*3/UL (ref 0.05–0.8)
MONOCYTES NFR BLD AUTO: 11 %
NEUTROPHILS # BLD AUTO: 4.13 X10*3/UL (ref 1.6–5.5)
NEUTROPHILS NFR BLD AUTO: 63.7 %
NITRITE UR QL STRIP.AUTO: NEGATIVE
NRBC BLD-RTO: 0 /100 WBCS (ref 0–0)
PH UR STRIP.AUTO: 7.5 [PH]
PLATELET # BLD AUTO: 259 X10*3/UL (ref 150–450)
POTASSIUM SERPL-SCNC: 4.2 MMOL/L (ref 3.5–5.3)
PROT SERPL-MCNC: 7.2 G/DL (ref 6.4–8.2)
PROT UR STRIP.AUTO-MCNC: NEGATIVE MG/DL
RBC # BLD AUTO: 4.07 X10*6/UL (ref 4–5.2)
RBC # UR STRIP.AUTO: NEGATIVE MG/DL
SODIUM SERPL-SCNC: 133 MMOL/L (ref 136–145)
SP GR UR STRIP.AUTO: 1
TSH SERPL-ACNC: 1.89 MIU/L (ref 0.44–3.98)
UROBILINOGEN UR STRIP.AUTO-MCNC: NORMAL MG/DL
WBC # BLD AUTO: 6.5 X10*3/UL (ref 4.4–11.3)

## 2025-07-06 PROCEDURE — 36415 COLL VENOUS BLD VENIPUNCTURE: CPT | Performed by: EMERGENCY MEDICINE

## 2025-07-06 PROCEDURE — 84443 ASSAY THYROID STIM HORMONE: CPT | Performed by: EMERGENCY MEDICINE

## 2025-07-06 PROCEDURE — 83735 ASSAY OF MAGNESIUM: CPT | Performed by: EMERGENCY MEDICINE

## 2025-07-06 PROCEDURE — 84484 ASSAY OF TROPONIN QUANT: CPT | Performed by: EMERGENCY MEDICINE

## 2025-07-06 PROCEDURE — 83605 ASSAY OF LACTIC ACID: CPT | Performed by: EMERGENCY MEDICINE

## 2025-07-06 PROCEDURE — 93005 ELECTROCARDIOGRAM TRACING: CPT

## 2025-07-06 PROCEDURE — 81003 URINALYSIS AUTO W/O SCOPE: CPT | Performed by: EMERGENCY MEDICINE

## 2025-07-06 PROCEDURE — 2500000004 HC RX 250 GENERAL PHARMACY W/ HCPCS (ALT 636 FOR OP/ED): Performed by: EMERGENCY MEDICINE

## 2025-07-06 PROCEDURE — 99284 EMERGENCY DEPT VISIT MOD MDM: CPT | Performed by: EMERGENCY MEDICINE

## 2025-07-06 PROCEDURE — 85025 COMPLETE CBC W/AUTO DIFF WBC: CPT | Performed by: EMERGENCY MEDICINE

## 2025-07-06 PROCEDURE — 84075 ASSAY ALKALINE PHOSPHATASE: CPT | Performed by: EMERGENCY MEDICINE

## 2025-07-06 RX ADMIN — SODIUM CHLORIDE 500 ML: 0.9 INJECTION, SOLUTION INTRAVENOUS at 14:06

## 2025-07-06 ASSESSMENT — PAIN - FUNCTIONAL ASSESSMENT: PAIN_FUNCTIONAL_ASSESSMENT: 0-10

## 2025-07-06 ASSESSMENT — PAIN SCALES - GENERAL: PAINLEVEL_OUTOF10: 0 - NO PAIN

## 2025-07-06 NOTE — ED PROVIDER NOTES
Emergency Department Provider Note     HPI  Patient is a emily 85-year-old female with a past medical history significant for anxiety, bradycardia, hypertension, PVCs, shingles who presents the emergency room for syncopal event.  Patient states that she went to bed late last night because she has an issue with insomnia secondary to her  passing away last year.  She took her metoprolol at midnight and took amlodipine at 6 AM.  She was otherwise well and had toast and coffee for breakfast when she went to [employer].  [place] she was standing when she all of a sudden became lightheaded.  She did not have any pain including headache, chest pain, shortness of breath, nausea, vomiting, palpitations but did feel like she might pass out so she sat herself down.  Someone then saw her slightly shaky and they lowered her to the ground because they thought that she was going to pass out.  Patient remembers most of this send states that she was out for less than a minute before she was alert and oriented to her baseline.  Somebody brought her water and apple juice and by the time EMS arrived she was feeling improved.  She had a blood sugar of 110 on arrival and her vital signs were otherwise stable.  She continues to be asymptomatic here and denies any neurologic deficits at this well.  She has not been ill recently.  She does endorse that it was warm in the [employer] but denies sweating.  At 1 point during the syncopal event she saw arrows with her eyes closed.      PMHx: As above  PSHx: Denies pertinent  FamilyHx: Son has a history of vasovagal syncope  SocialHx: Denies  Allergies: Keflex  Medications: See Medication Reconciliation     ROS  As above otherwise in    Physical Exam    GENERAL: Awake and Alert, No Acute Distress  HEENT: AT/NC, PERRL, EOMI, Normal Oropharynx, No Signs of Dehydration  NECK: Normal Inspection, No JVD  CARDIOVASCULAR: RRR, No M/R/G  RESPIRATORY: CTA Bilaterally, No Wheezes, Rales or Rhonchi,  Chest Wall Non-tender  ABDOMEN: Soft, non-tender abdomen, Normal Bowel Sounds, No Distention  BACK: No CVA Tenderness  SKIN: Normal Color, Warm, Dry, No Rashes   EXTREMITIES: Non-Tender, Full ROM, No Pedal Edema  NEURO: A&O x 3, Normal Motor and Sensation, Normal Mood and Affect    Nursing Assessment and Vitals Reviewed    Medical Decision  Patient is a emily 85-year-old female with a past medical history significant for anxiety, bradycardia, hypertension, PVCs, shingles who presents the emergency room for syncopal event.  Patient states that she went to bed late last night because she has an issue with insomnia secondary to her  passing away last year.  She took her metoprolol at midnight and took amlodipine at 6 AM.  She was otherwise well and had toast and coffee for breakfast when she went to [employer].  [place] she was standing when she all of a sudden became lightheaded.  She did not have any pain including headache, chest pain, shortness of breath, nausea, vomiting, palpitations but did feel like she might pass out so she sat herself down.  Someone then saw her slightly shaky and they lowered her to the ground because they thought that she was going to pass out.  Patient remembers most of this send states that she was out for less than a minute before she was alert and oriented to her baseline.  Somebody brought her water and apple juice and by the time EMS arrived she was feeling improved.  She had a blood sugar of 110 on arrival and her vital signs were otherwise stable.  She continues to be asymptomatic here and denies any neurologic deficits at this well.  She has not been ill recently.  She does endorse that it was warm in the [employer] but denies sweating.  At 1 point during the syncopal event she saw arrows with her eyes closed.      On evaluation patient is extremely well-appearing and in no acute distress.  Lungs are clear and heart is regular.  Abdomen is soft nontender nondistended.  She is  neurologically intact.  Patient is saturating very well on room air without signs of hypoxia, tachypnea or tachycardia.    Workup for patient include labs that revealed mild hyponatremia and she started on IV fluids.  She has no leukocytosis or anemia.  No emergent electrolyte imbalance.  She has 2 negative troponins.  Urinalysis is negative.  Her EKG was discussed with cardiology and per our discussion she is safe to be discharged home from a cardiac standpoint.  Patient has no other complaint suggestive of any other emergent process and is discharged in stable condition with education on supportive care at home as well as signs and symptoms that should prompt immediate turn to emergency room.  She is to continue her medications as previously prescribed and she will follow-up with her doctor this week.    Ayala Jerome MD  Emergency Medicine                                                       Ayala Jerome MD  07/06/25 0101

## 2025-07-06 NOTE — ED TRIAGE NOTES
Pt to ED via ems from [facility]. Per ems pt had witnessed syncopal episode at [facility]. Pt arrives a/o x4, denies CP/SOB. States this has not happened before. Pt is asymptomatic at this time.

## 2025-07-06 NOTE — DISCHARGE INSTRUCTIONS
Please make sure to follow-up closely with your primary care physician this week.  Continue to take your medications as indicated.  Return immediately if concerning symptoms, as discussed.   3

## 2025-07-07 LAB
ATRIAL RATE: 50 BPM
HOLD SPECIMEN: NORMAL
P OFFSET: 165 MS
P ONSET: 121 MS
PR INTERVAL: 194 MS
Q ONSET: 218 MS
QRS COUNT: 8 BEATS
QRS DURATION: 76 MS
QT INTERVAL: 464 MS
QTC CALCULATION(BAZETT): 423 MS
QTC FREDERICIA: 436 MS
R AXIS: -11 DEGREES
T AXIS: -8 DEGREES
T OFFSET: 450 MS
VENTRICULAR RATE: 50 BPM

## 2025-07-08 ENCOUNTER — TELEPHONE (OUTPATIENT)
Dept: PRIMARY CARE | Facility: CLINIC | Age: 85
End: 2025-07-08
Payer: COMMERCIAL

## 2025-07-08 NOTE — TELEPHONE ENCOUNTER
Patient passed out and was taken to the ER on Saturday, says they found nothing wrong but wanted her to follow up with PCP this week.  She is already coming in on 7/28.

## 2025-07-14 ENCOUNTER — APPOINTMENT (OUTPATIENT)
Dept: DERMATOLOGY | Facility: CLINIC | Age: 85
End: 2025-07-14
Payer: MEDICARE

## 2025-07-14 DIAGNOSIS — L82.1 SEBORRHEIC KERATOSIS: ICD-10-CM

## 2025-07-14 DIAGNOSIS — D22.9 MULTIPLE BENIGN NEVI: ICD-10-CM

## 2025-07-14 DIAGNOSIS — L57.0 ACTINIC KERATOSIS: ICD-10-CM

## 2025-07-14 DIAGNOSIS — Z85.828 PERSONAL HISTORY OF SKIN CANCER: Primary | ICD-10-CM

## 2025-07-14 PROCEDURE — 17000 DESTRUCT PREMALG LESION: CPT | Performed by: STUDENT IN AN ORGANIZED HEALTH CARE EDUCATION/TRAINING PROGRAM

## 2025-07-14 PROCEDURE — 99213 OFFICE O/P EST LOW 20 MIN: CPT | Performed by: STUDENT IN AN ORGANIZED HEALTH CARE EDUCATION/TRAINING PROGRAM

## 2025-07-14 PROCEDURE — 17003 DESTRUCT PREMALG LES 2-14: CPT | Performed by: STUDENT IN AN ORGANIZED HEALTH CARE EDUCATION/TRAINING PROGRAM

## 2025-07-14 PROCEDURE — 1159F MED LIST DOCD IN RCRD: CPT | Performed by: STUDENT IN AN ORGANIZED HEALTH CARE EDUCATION/TRAINING PROGRAM

## 2025-07-14 ASSESSMENT — DERMATOLOGY PATIENT ASSESSMENT
DO YOU HAVE ANY NEW OR CHANGING LESIONS: NO
DO YOU HAVE IRREGULAR MENSTRUAL CYCLES: NO
DO YOU USE A TANNING BED: NO
HAVE YOU HAD OR DO YOU HAVE A STAPH INFECTION: NO
ARE YOU ON BIRTH CONTROL: NO
HAVE YOU HAD OR DO YOU HAVE VASCULAR DISEASE: NO
ARE YOU TRYING TO GET PREGNANT: NO
ARE YOU AN ORGAN TRANSPLANT RECIPIENT: NO

## 2025-07-14 ASSESSMENT — DERMATOLOGY QUALITY OF LIFE (QOL) ASSESSMENT
RATE HOW EMOTIONALLY BOTHERED YOU ARE BY YOUR SKIN PROBLEM (FOR EXAMPLE, WORRY, EMBARRASSMENT, FRUSTRATION): 0 - NEVER BOTHERED
ARE THERE EXCLUSIONS OR EXCEPTIONS FOR THE QUALITY OF LIFE ASSESSMENT: NO
WHAT SINGLE SKIN CONDITION LISTED BELOW IS THE PATIENT ANSWERING THE QUALITY-OF-LIFE ASSESSMENT QUESTIONS ABOUT: NONE OF THE ABOVE
DATE THE QUALITY-OF-LIFE ASSESSMENT WAS COMPLETED: 67400
RATE HOW BOTHERED YOU ARE BY SYMPTOMS OF YOUR SKIN PROBLEM (EG, ITCHING, STINGING BURNING, HURTING OR SKIN IRRITATION): 0 - NEVER BOTHERED
RATE HOW BOTHERED YOU ARE BY EFFECTS OF YOUR SKIN PROBLEMS ON YOUR ACTIVITIES (EG, GOING OUT, ACCOMPLISHING WHAT YOU WANT, WORK ACTIVITIES OR YOUR RELATIONSHIPS WITH OTHERS): 0 - NEVER BOTHERED

## 2025-07-14 ASSESSMENT — PATIENT GLOBAL ASSESSMENT (PGA): PATIENT GLOBAL ASSESSMENT: PATIENT GLOBAL ASSESSMENT:  1 - CLEAR

## 2025-07-14 ASSESSMENT — ITCH NUMERIC RATING SCALE: HOW SEVERE IS YOUR ITCHING?: 0

## 2025-07-14 NOTE — PROGRESS NOTES
Subjective     Selam Sanabria is a 85 y.o. female who presents for the following: Skin Check (FBSE, areas of concern on arms, neck and chest. HX of skin cancer).     Ms. Sanabria was concerned about some scaly spots that have been itching her. Otherwise, does not have any lesions of concern or any skin concerns. Here for her annual FBSE.    Intake Questions  Do you have any new or changing Lesions?: No  Are you an organ transplant recipient?: No  Have you had or do you have a Staph Infection?: No  Have you had or do you have Vacular Disease?: No  Do you use sunscreen?: None  Do you use a tanning bed?: No  Are you trying to get pregnant?: No  Are you on birth control?: No  Do you have irregular menstrual cycles?: No    Review of Systems:  No other skin or systemic complaints other than what is documented elsewhere in the note.    The following portions of the chart were reviewed this encounter and updated as appropriate:       Skin Cancer History  Biopsy Log Book  No skin cancers from Specimen Tracking.    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    A full examination was performed including scalp, head, eyes, ears, nose, lips, neck, chest, axillae, abdomen, back, buttocks, bilateral upper extremities, bilateral lower extremities, hands, feet, fingers, toes, fingernails, and toenails. All findings within normal limits unless otherwise noted below.    Assessment/Plan   Skin Exam  1. PERSONAL HISTORY OF  "SKIN CANCER    No lesions of concern today. Educated patient on sun protection and will continue to monitor.   This Visit  - Follow Up In Dermatology - Established Patient  2. SEBORRHEIC KERATOSIS  Head - Anterior (Face)  Inflamed seborrheic keratoses: pink and brown \"stuck on\" verrucous scaly papule with surrounding erythema and bloody crust.  3. MULTIPLE BENIGN NEVI  Generalized  Exam findings: Benign appearing macules and papules  Plan: monitor for any new or changing nevi. Notify me should this occur.  Over the counter use of sun screen product (30+ SPF with mineral sun screen) recommended    4. ACTINIC KERATOSIS (4)  Generalized, Left Forehead, Left Upper Arm - Anterior, Right Breast  Erythematous macules with gritty scale.  - Destr of lesion - Generalized, Left Forehead, Left Upper Arm - Anterior, Right Breast  Complexity: simple    Destruction method: cryotherapy    Informed consent: discussed and consent obtained    Lesion destroyed using liquid nitrogen: Yes    Region frozen until ice ball extended beyond lesion: Yes    Cryotherapy cycles:  1  Outcome: patient tolerated procedure well with no complications    Post-procedure details: wound care instructions given      RTC 1 year for FBSE.    Farnaz Coleman MD  PGY-2 Dermatology    I was present during all key portions of visit including history, exam, discussion/plan and/or procedures and directly supervised our resident during all portions of the visit, follow up care, medications and more    Doni Negro MD     "

## 2025-07-16 ENCOUNTER — APPOINTMENT (OUTPATIENT)
Dept: PRIMARY CARE | Facility: CLINIC | Age: 85
End: 2025-07-16
Payer: MEDICARE

## 2025-07-16 VITALS
HEIGHT: 58 IN | TEMPERATURE: 97.3 F | OXYGEN SATURATION: 98 % | BODY MASS INDEX: 24.77 KG/M2 | HEART RATE: 58 BPM | SYSTOLIC BLOOD PRESSURE: 140 MMHG | DIASTOLIC BLOOD PRESSURE: 80 MMHG | WEIGHT: 118 LBS

## 2025-07-16 DIAGNOSIS — E87.1 HYPONATREMIA: ICD-10-CM

## 2025-07-16 DIAGNOSIS — R55 SYNCOPE, UNSPECIFIED SYNCOPE TYPE: Primary | ICD-10-CM

## 2025-07-16 PROCEDURE — 1160F RVW MEDS BY RX/DR IN RCRD: CPT | Performed by: FAMILY MEDICINE

## 2025-07-16 PROCEDURE — 1126F AMNT PAIN NOTED NONE PRSNT: CPT | Performed by: FAMILY MEDICINE

## 2025-07-16 PROCEDURE — 3077F SYST BP >= 140 MM HG: CPT | Performed by: FAMILY MEDICINE

## 2025-07-16 PROCEDURE — 1159F MED LIST DOCD IN RCRD: CPT | Performed by: FAMILY MEDICINE

## 2025-07-16 PROCEDURE — 3079F DIAST BP 80-89 MM HG: CPT | Performed by: FAMILY MEDICINE

## 2025-07-16 PROCEDURE — 99213 OFFICE O/P EST LOW 20 MIN: CPT | Performed by: FAMILY MEDICINE

## 2025-07-16 PROCEDURE — G2211 COMPLEX E/M VISIT ADD ON: HCPCS | Performed by: FAMILY MEDICINE

## 2025-07-16 ASSESSMENT — ENCOUNTER SYMPTOMS
DYSPHORIC MOOD: 0
COUGH: 0
FATIGUE: 0
TROUBLE SWALLOWING: 0
FEVER: 0
HEMATURIA: 0
LIGHT-HEADEDNESS: 0
DYSURIA: 0
WEAKNESS: 1
UNEXPECTED WEIGHT CHANGE: 0
TREMORS: 0
BLOOD IN STOOL: 0
NAUSEA: 0
VOMITING: 0
BRUISES/BLEEDS EASILY: 0
ARTHRALGIAS: 1
SHORTNESS OF BREATH: 0
PALPITATIONS: 0
DIARRHEA: 0
ABDOMINAL PAIN: 0
JOINT SWELLING: 0
SINUS PAIN: 0

## 2025-07-16 ASSESSMENT — PAIN SCALES - GENERAL: PAINLEVEL_OUTOF10: 0-NO PAIN

## 2025-07-16 NOTE — PATIENT INSTRUCTIONS
It was nice to see you today!  Discussed current concerns and addressed   Reviewed recent labs and diagnostics  Reviewed medications list  Continue to eat a healthy diet, exercise at least 3 times a week or more  Plan and follow up discussed  Most likely related to electrolytes and fluid status.  She feels back to normal.  She has an appointment with me at the end of the month.  If it occurs again she is to go to the ER and we will evaluate further.

## 2025-07-16 NOTE — PROGRESS NOTES
Subjective   Patient ID: Selam Sanabria is a 85 y.o. female.    Selam comes in after a very brief syncopal episode while standing in [employer].  There was no chest pain shortness of breath, fever or chills or any other prodrome.  She was awake within seconds.  She went to the ER.  I reviewed all the labs and everything was unremarkable except for a low sodium.  She has no cardiac history.  No recent fall and no focal neurological deficits.        Review of Systems   Constitutional:  Negative for fatigue, fever and unexpected weight change.   HENT:  Negative for congestion, ear pain, nosebleeds, sinus pain and trouble swallowing.    Eyes:  Negative for visual disturbance.   Respiratory:  Negative for cough and shortness of breath.    Cardiovascular:  Negative for chest pain and palpitations.   Gastrointestinal:  Negative for abdominal pain, blood in stool, diarrhea, nausea and vomiting.   Genitourinary:  Negative for dysuria and hematuria.   Musculoskeletal:  Positive for arthralgias and gait problem. Negative for joint swelling.   Neurological:  Positive for weakness. Negative for tremors and light-headedness.        Near syncope as above   Hematological:  Does not bruise/bleed easily.   Psychiatric/Behavioral:  Negative for dysphoric mood and suicidal ideas.      Vitals:    07/16/25 1004   BP: 140/80   Pulse: 58   Temp: 36.3 °C (97.3 °F)   SpO2: 98%      Body mass index is 24.66 kg/m².  Objective   Physical Exam  Constitutional:       Appearance: Normal appearance.     Cardiovascular:      Rate and Rhythm: Normal rate and regular rhythm.      Heart sounds: Normal heart sounds.   Pulmonary:      Effort: Pulmonary effort is normal.      Breath sounds: Normal breath sounds.     Musculoskeletal:      Cervical back: Neck supple.      Right lower leg: No edema.      Left lower leg: No edema.     Skin:     General: Skin is warm and dry.     Neurological:      General: No focal deficit present.      Mental Status: She is  "alert. Mental status is at baseline.      Cranial Nerves: No cranial nerve deficit.     Psychiatric:         Mood and Affect: Mood normal.         Speech: Speech normal.         Behavior: Behavior normal.         Thought Content: Thought content normal.         Cognition and Memory: Cognition normal.         Judgment: Judgment normal.         Last Labs:     CMP:   Lab Results   Component Value Date    CALCIUM 9.6 07/06/2025    CALCIUM 10.3 05/06/2025    CALCIUM 9.7 04/22/2024    PHOS 3.0 12/19/2019    PHOS 2.9 12/17/2019    PROT 7.2 07/06/2025    PROT 7.5 05/06/2025    PROT 6.4 04/22/2024    ALBUMIN 4.1 07/06/2025    ALBUMIN 4.6 05/06/2025    ALBUMIN 4.0 04/22/2024    AST 20 07/06/2025    AST 22 05/06/2025    AST 18 04/22/2024    ALKPHOS 52 07/06/2025    ALKPHOS 50 05/06/2025    ALKPHOS 56 04/22/2024    BILITOT 0.6 07/06/2025    BILITOT 0.7 05/06/2025    BILITOT 0.6 04/22/2024     CBC:   Lab Results   Component Value Date    WBC 6.5 07/06/2025    WBC 5.3 05/06/2025    WBC 4.9 04/22/2024    HGB 12.4 07/06/2025    HGB 13.7 05/06/2025    HGB 12.4 04/22/2024    HCT 37.8 07/06/2025    HCT 41.1 05/06/2025    HCT 38.0 04/22/2024    MCV 93 07/06/2025    MCV 91.9 05/06/2025    MCV 93 04/22/2024     07/06/2025     05/06/2025     04/22/2024     A1C:   No results found for: \"HGBA1C\"  LIPID PANEL:   Lab Results   Component Value Date    CHOL 234 (H) 05/06/2025    CHOL 182 04/22/2024    CHOL 207 (H) 05/02/2023    CHOL 198 01/28/2021    TRIG 36 05/06/2025    TRIG 56 04/22/2024    TRIG 45 05/02/2023    TRIG 74 01/28/2021     05/06/2025    HDL 95.8 04/22/2024    .5 05/02/2023    HDL 96.4 01/28/2021    CHHDL 1.9 05/06/2025    CHHDL 1.9 04/22/2024    CHHDL 2.0 05/02/2023    CHHDL 2.1 01/28/2021    LDLF 97 05/02/2023    LDLF 87 01/28/2021    VLDL 11 04/22/2024    VLDL 9 05/02/2023    VLDL 15 01/28/2021    NHDL 110 05/06/2025    NHDL 86 04/22/2024     TSH:   Lab Results   Component Value Date    TSH " "1.89 07/06/2025    TSH 1.40 05/06/2025    TSH 0.75 04/22/2024     PSA:   No results found for: \"PSA\"     Assessment/Plan   There are no diagnoses linked to this encounter.    "

## 2025-07-26 DIAGNOSIS — R79.89 LOW TSH LEVEL: ICD-10-CM

## 2025-07-28 ENCOUNTER — APPOINTMENT (OUTPATIENT)
Dept: PRIMARY CARE | Facility: CLINIC | Age: 85
End: 2025-07-28
Payer: COMMERCIAL

## 2025-07-28 VITALS
TEMPERATURE: 97.3 F | HEART RATE: 62 BPM | WEIGHT: 118 LBS | HEIGHT: 58 IN | OXYGEN SATURATION: 97 % | BODY MASS INDEX: 24.77 KG/M2 | DIASTOLIC BLOOD PRESSURE: 80 MMHG | SYSTOLIC BLOOD PRESSURE: 130 MMHG

## 2025-07-28 DIAGNOSIS — B02.22 POST-HERPETIC TRIGEMINAL NEURALGIA: Primary | ICD-10-CM

## 2025-07-28 DIAGNOSIS — G14 POSTPOLIO SYNDROME: ICD-10-CM

## 2025-07-28 PROBLEM — B02.29 OTHER POSTHERPETIC NERVOUS SYSTEM INVOLVEMENT: Status: RESOLVED | Noted: 2023-07-07 | Resolved: 2025-07-28

## 2025-07-28 LAB
ATRIAL RATE: 50 BPM
P OFFSET: 165 MS
P ONSET: 121 MS
PR INTERVAL: 194 MS
Q ONSET: 218 MS
QRS COUNT: 8 BEATS
QRS DURATION: 76 MS
QT INTERVAL: 464 MS
QTC CALCULATION(BAZETT): 423 MS
QTC FREDERICIA: 436 MS
R AXIS: -11 DEGREES
T AXIS: -8 DEGREES
T OFFSET: 450 MS
VENTRICULAR RATE: 50 BPM

## 2025-07-28 PROCEDURE — 1159F MED LIST DOCD IN RCRD: CPT | Performed by: FAMILY MEDICINE

## 2025-07-28 PROCEDURE — 1160F RVW MEDS BY RX/DR IN RCRD: CPT | Performed by: FAMILY MEDICINE

## 2025-07-28 PROCEDURE — 1036F TOBACCO NON-USER: CPT | Performed by: FAMILY MEDICINE

## 2025-07-28 PROCEDURE — 3075F SYST BP GE 130 - 139MM HG: CPT | Performed by: FAMILY MEDICINE

## 2025-07-28 PROCEDURE — 1126F AMNT PAIN NOTED NONE PRSNT: CPT | Performed by: FAMILY MEDICINE

## 2025-07-28 PROCEDURE — 99213 OFFICE O/P EST LOW 20 MIN: CPT | Performed by: FAMILY MEDICINE

## 2025-07-28 PROCEDURE — 3079F DIAST BP 80-89 MM HG: CPT | Performed by: FAMILY MEDICINE

## 2025-07-28 RX ORDER — LIOTHYRONINE SODIUM 5 UG/1
5 TABLET ORAL 3 TIMES DAILY
Qty: 270 TABLET | Refills: 1 | Status: SHIPPED | OUTPATIENT
Start: 2025-07-28

## 2025-07-28 ASSESSMENT — ENCOUNTER SYMPTOMS
NAUSEA: 0
LIGHT-HEADEDNESS: 0
DIARRHEA: 0
DYSPHORIC MOOD: 0
FEVER: 0
FATIGUE: 0
UNEXPECTED WEIGHT CHANGE: 0
DYSURIA: 0
ABDOMINAL PAIN: 0
WEAKNESS: 0
BRUISES/BLEEDS EASILY: 0
VOMITING: 0
ARTHRALGIAS: 1
SHORTNESS OF BREATH: 0
TREMORS: 0
JOINT SWELLING: 0
HEMATURIA: 0
SINUS PAIN: 0
BLOOD IN STOOL: 0
PALPITATIONS: 0
TROUBLE SWALLOWING: 0
COUGH: 0

## 2025-07-28 ASSESSMENT — PAIN SCALES - GENERAL: PAINLEVEL_OUTOF10: 0-NO PAIN

## 2025-07-28 ASSESSMENT — PATIENT HEALTH QUESTIONNAIRE - PHQ9
2. FEELING DOWN, DEPRESSED OR HOPELESS: NOT AT ALL
SUM OF ALL RESPONSES TO PHQ9 QUESTIONS 1 AND 2: 0
1. LITTLE INTEREST OR PLEASURE IN DOING THINGS: NOT AT ALL

## 2025-07-28 NOTE — PATIENT INSTRUCTIONS
It was nice to see you today!  Discussed current concerns and addressed   Reviewed recent labs and diagnostics  Reviewed medications list  Continue to eat a healthy diet, exercise at least 3 times a week or more  Plan and follow up discussed  For any further information related to your condition, copy and paste or go to [email or web address]  Follow up for wellness

## 2025-07-28 NOTE — PROGRESS NOTES
Subjective   Patient ID: Selam Sanabria is a 85 y.o. female.    Patient comes in for follow-up syncope.  Most likely vasovagal or orthostatic.  It has not happened again.  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  She is otherwise doing well and still grieving her 's death.        Review of Systems   Constitutional:  Negative for fatigue, fever and unexpected weight change.   HENT:  Negative for congestion, ear pain, nosebleeds, sinus pain and trouble swallowing.    Eyes:  Negative for visual disturbance.   Respiratory:  Negative for cough and shortness of breath.    Cardiovascular:  Negative for chest pain and palpitations.   Gastrointestinal:  Negative for abdominal pain, blood in stool, diarrhea, nausea and vomiting.   Genitourinary:  Negative for dysuria and hematuria.   Musculoskeletal:  Positive for arthralgias and gait problem. Negative for joint swelling.   Neurological:  Negative for tremors, weakness and light-headedness.   Hematological:  Does not bruise/bleed easily.   Psychiatric/Behavioral:  Negative for dysphoric mood and suicidal ideas.      Vitals:    07/28/25 1025   BP: 130/80   Pulse: 62   Temp: 36.3 °C (97.3 °F)   SpO2: 97%      Body mass index is 24.66 kg/m².  Objective   Physical Exam  Constitutional:       Appearance: Normal appearance.     Cardiovascular:      Rate and Rhythm: Normal rate and regular rhythm.      Heart sounds: Normal heart sounds.   Pulmonary:      Effort: Pulmonary effort is normal.      Breath sounds: Normal breath sounds.     Musculoskeletal:      Cervical back: Neck supple.      Right lower leg: No edema.      Left lower leg: No edema.     Skin:     General: Skin is warm and dry.     Neurological:      General: No focal deficit present.      Mental Status: She is alert.      Motor: Weakness present.      Gait: Gait abnormal.     Psychiatric:         Mood and Affect: Mood  "normal.         Speech: Speech normal.         Behavior: Behavior normal.         Cognition and Memory: Cognition normal.         Last Labs:     CMP:   Lab Results   Component Value Date    CALCIUM 9.6 07/06/2025    CALCIUM 10.3 05/06/2025    CALCIUM 9.7 04/22/2024    PHOS 3.0 12/19/2019    PHOS 2.9 12/17/2019    PROT 7.2 07/06/2025    PROT 7.5 05/06/2025    PROT 6.4 04/22/2024    ALBUMIN 4.1 07/06/2025    ALBUMIN 4.6 05/06/2025    ALBUMIN 4.0 04/22/2024    AST 20 07/06/2025    AST 22 05/06/2025    AST 18 04/22/2024    ALKPHOS 52 07/06/2025    ALKPHOS 50 05/06/2025    ALKPHOS 56 04/22/2024    BILITOT 0.6 07/06/2025    BILITOT 0.7 05/06/2025    BILITOT 0.6 04/22/2024     CBC:   Lab Results   Component Value Date    WBC 6.5 07/06/2025    WBC 5.3 05/06/2025    WBC 4.9 04/22/2024    HGB 12.4 07/06/2025    HGB 13.7 05/06/2025    HGB 12.4 04/22/2024    HCT 37.8 07/06/2025    HCT 41.1 05/06/2025    HCT 38.0 04/22/2024    MCV 93 07/06/2025    MCV 91.9 05/06/2025    MCV 93 04/22/2024     07/06/2025     05/06/2025     04/22/2024     A1C:   No results found for: \"HGBA1C\"  LIPID PANEL:   Lab Results   Component Value Date    CHOL 234 (H) 05/06/2025    CHOL 182 04/22/2024    CHOL 207 (H) 05/02/2023    CHOL 198 01/28/2021    TRIG 36 05/06/2025    TRIG 56 04/22/2024    TRIG 45 05/02/2023    TRIG 74 01/28/2021     05/06/2025    HDL 95.8 04/22/2024    .5 05/02/2023    HDL 96.4 01/28/2021    CHHDL 1.9 05/06/2025    CHHDL 1.9 04/22/2024    CHHDL 2.0 05/02/2023    CHHDL 2.1 01/28/2021    LDLF 97 05/02/2023    LDLF 87 01/28/2021    VLDL 11 04/22/2024    VLDL 9 05/02/2023    VLDL 15 01/28/2021    NHDL 110 05/06/2025    NHDL 86 04/22/2024     TSH:   Lab Results   Component Value Date    TSH 1.89 07/06/2025    TSH 1.40 05/06/2025    TSH 0.75 04/22/2024     PSA:   No results found for: \"PSA\"     Assessment/Plan   Diagnoses and all orders for this visit:  Post-herpetic trigeminal neuralgia  Postpolio " syndrome

## 2025-08-31 DIAGNOSIS — I10 PRIMARY HYPERTENSION: ICD-10-CM

## 2025-09-02 RX ORDER — AMLODIPINE BESYLATE 5 MG/1
5 TABLET ORAL DAILY
Qty: 90 TABLET | Refills: 3 | Status: SHIPPED | OUTPATIENT
Start: 2025-09-02

## 2026-01-20 ENCOUNTER — APPOINTMENT (OUTPATIENT)
Dept: PRIMARY CARE | Facility: CLINIC | Age: 86
End: 2026-01-20
Payer: COMMERCIAL

## 2026-01-22 ENCOUNTER — APPOINTMENT (OUTPATIENT)
Dept: PRIMARY CARE | Facility: CLINIC | Age: 86
End: 2026-01-22
Payer: COMMERCIAL

## 2026-07-17 ENCOUNTER — APPOINTMENT (OUTPATIENT)
Dept: DERMATOLOGY | Facility: CLINIC | Age: 86
End: 2026-07-17
Payer: COMMERCIAL